# Patient Record
Sex: MALE | Race: WHITE | NOT HISPANIC OR LATINO | Employment: STUDENT | URBAN - METROPOLITAN AREA
[De-identification: names, ages, dates, MRNs, and addresses within clinical notes are randomized per-mention and may not be internally consistent; named-entity substitution may affect disease eponyms.]

---
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
## 2022-04-15 ENCOUNTER — APPOINTMENT (EMERGENCY)
Dept: RADIOLOGY | Facility: HOSPITAL | Age: 22
End: 2022-04-15
Payer: COMMERCIAL

## 2022-04-15 ENCOUNTER — HOSPITAL ENCOUNTER (EMERGENCY)
Facility: HOSPITAL | Age: 22
Discharge: HOME | End: 2022-04-15
Attending: EMERGENCY MEDICINE
Payer: COMMERCIAL

## 2022-04-15 VITALS
OXYGEN SATURATION: 99 % | WEIGHT: 227 LBS | TEMPERATURE: 98.7 F | SYSTOLIC BLOOD PRESSURE: 150 MMHG | HEIGHT: 68 IN | RESPIRATION RATE: 18 BRPM | HEART RATE: 82 BPM | DIASTOLIC BLOOD PRESSURE: 66 MMHG | BODY MASS INDEX: 34.4 KG/M2

## 2022-04-15 DIAGNOSIS — R14.1 ABDOMINAL GAS PAIN: ICD-10-CM

## 2022-04-15 DIAGNOSIS — R10.9 ABDOMINAL PAIN: Primary | ICD-10-CM

## 2022-04-15 DIAGNOSIS — K59.00 CONSTIPATION: ICD-10-CM

## 2022-04-15 PROCEDURE — 99282 EMERGENCY DEPT VISIT SF MDM: CPT | Performed by: EMERGENCY MEDICINE

## 2022-04-15 PROCEDURE — 99284 EMERGENCY DEPT VISIT MOD MDM: CPT

## 2022-04-15 PROCEDURE — 74022 RADEX COMPL AQT ABD SERIES: CPT

## 2022-04-15 RX ORDER — SIMETHICONE 80 MG
80 TABLET,CHEWABLE ORAL ONCE
Status: COMPLETED | OUTPATIENT
Start: 2022-04-15 | End: 2022-04-15

## 2022-04-15 RX ORDER — SIMETHICONE 180 MG
180 CAPSULE ORAL EVERY 8 HOURS PRN
Qty: 15 CAPSULE | Refills: 0 | Status: SHIPPED | OUTPATIENT
Start: 2022-04-15 | End: 2022-05-06

## 2022-04-15 RX ADMIN — SIMETHICONE 80 MG: 80 TABLET, CHEWABLE ORAL at 09:50

## 2022-04-15 NOTE — ED PROVIDER NOTES
History  Chief Complaint   Patient presents with    Abdominal Pain     left side pain- woke him up at 52      25year-old otherwise healthy male presents to the ED for evaluation of acute onset left-sided abdominal pain since waking up this morning  Pain lasted for several minutes  Mom became very concerned as patient has never had pain like this before  Mom brought patient to the emergency department for further evaluation  Patient states that he felt a little bloated and gassy  After he passed some gas his pain resolved  Patient is currently pain-free in the emergency department  Patient denies any nausea, vomiting, diarrhea, dysuria, or any increased urinary frequency  Patient denies any fevers or chills  Patient is vaccinated against COVID-19  Mom states that patient does not eat healthy  Patient has had intermittent constipations  Last bowel movement was yesterday  History provided by:  Parent and patient  Abdominal Pain  Associated symptoms: no chest pain, no cough, no diarrhea, no dysuria, no fatigue, no fever, no nausea, no shortness of breath, no sore throat and no vomiting        Prior to Admission Medications   Prescriptions Last Dose Informant Patient Reported? Taking?    DULoxetine (CYMBALTA) 30 mg delayed release capsule 4/14/2022 at Unknown time  No Yes   Sig: Take 1 capsule (30 mg total) by mouth daily In am      Facility-Administered Medications: None       Past Medical History:   Diagnosis Date    Anxiety     Chronic nasal congestion     Earache, chronic     Migraine     Orthodontics     Premature baby     Wears eyeglasses        Past Surgical History:   Procedure Laterality Date    ACHILLES TENDON LENGTHENING Bilateral     AIRWAY SURGERY  2003    make airway larger with cartilage    BRONCHOSCOPY      Last assessed 02/07/17   Oriana Atkinson CARDIAC SURGERY      MYRINGOTOMY      Last assessed 02/07/17    NASAL SEPTOPLASTY W/ TURBINOPLASTY Bilateral 6/27/2019    Procedure: Izabela Mater SUBMUCOUS RESECTION, REPAIR NASAL VESTIBULAR STENOSIS;  Surgeon: Timothy Peña MD;  Location: [facility];  Service: ENT    NASAL/SINUS ENDOSCOPY N/A 6/27/2019    Procedure: IMAGED GUIDED FESS, ENDOSCOPIC MAXILLARY ANTROSTOMY & TOTAL ETHMOIDECTOMY;  Surgeon: Timothy Peña MD;  Location: [facility];  Service: ENT    PATENT DUCTUS ARTERIOUS LIGATION      Trans catheter closure       Family History   Problem Relation Age of Onset    Diabetes Father     Hypertension Paternal Grandmother     Cancer Family     No Known Problems Mother      I have reviewed and agree with the history as documented  E-Cigarette/Vaping    E-Cigarette Use Never User      E-Cigarette/Vaping Substances    Nicotine No     THC No     CBD No     Flavoring No     Other No     Unknown No      Social History     Tobacco Use    Smoking status: Never Smoker    Smokeless tobacco: Never Used   Vaping Use    Vaping Use: Never used   Substance Use Topics    Alcohol use: No    Drug use: No       Review of Systems   Constitutional: Negative for activity change, fatigue and fever  HENT: Negative for congestion, ear discharge and sore throat  Eyes: Negative for pain and redness  Respiratory: Negative for cough, chest tightness, shortness of breath and wheezing  Cardiovascular: Negative for chest pain  Gastrointestinal: Positive for abdominal pain  Negative for diarrhea, nausea and vomiting  Endocrine: Negative for cold intolerance  Genitourinary: Negative for dysuria and urgency  Musculoskeletal: Negative for arthralgias and back pain  Neurological: Negative for dizziness, weakness and headaches  Psychiatric/Behavioral: Negative for agitation and behavioral problems  Physical Exam  Physical Exam  Vitals and nursing note reviewed  Constitutional:       Appearance: He is well-developed  HENT:      Head: Normocephalic and atraumatic        Nose: Nose normal    Eyes:      Conjunctiva/sclera: Conjunctivae normal    Cardiovascular:      Rate and Rhythm: Normal rate and regular rhythm  Heart sounds: Normal heart sounds  Pulmonary:      Effort: Pulmonary effort is normal       Breath sounds: Normal breath sounds  Abdominal:      General: Bowel sounds are normal  There is no distension  Palpations: Abdomen is soft  Tenderness: There is no abdominal tenderness  Comments: Abdomen is soft, nondistended, with bowel sounds present all 4 quadrants  No tenderness noted to palpation of abdomen  Musculoskeletal:         General: Normal range of motion  Cervical back: Normal range of motion and neck supple  Skin:     General: Skin is warm  Neurological:      General: No focal deficit present  Mental Status: He is alert and oriented to person, place, and time  Psychiatric:         Mood and Affect: Mood normal          Behavior: Behavior normal          Thought Content: Thought content normal          Judgment: Judgment normal          Vital Signs  ED Triage Vitals [04/15/22 0732]   Temperature Pulse Respirations Blood Pressure SpO2   98 7 °F (37 1 °C) 92 18 141/82 98 %      Temp Source Heart Rate Source Patient Position - Orthostatic VS BP Location FiO2 (%)   Oral Monitor Sitting Left arm --      Pain Score       2           Vitals:    04/15/22 0846 04/15/22 0901 04/15/22 0916 04/15/22 0946   BP: 141/76 130/64 132/70 150/66   Pulse: 78 82 82 82   Patient Position - Orthostatic VS:             Visual Acuity      ED Medications  Medications   simethicone (MYLICON) chewable tablet 80 mg (80 mg Oral Given 4/15/22 0950)       Diagnostic Studies  Results Reviewed     None                 XR abdomen obstruction series    (Results Pending)              Procedures  Procedures         ED Course  ED Course as of 04/15/22 0959   Fri Apr 15, 2022   0915 Patient re-examined bedside  Patient has not had any abdominal pain since coming to the emergency department    Discussed radiology results with patient  Patient will be discharged home  NIKI      Most Recent Value   SBIRT (13-21 yo)    In order to provide better care to our patients, we are screening all of our patients for alcohol and drug use  Would it be okay to ask you these screening questions? Yes Filed at: 04/15/2022 0739   NIKI Initial Screen: During the past 12 months, did you:    1  Drink any alcohol (more than a few sips)? No Filed at: 04/15/2022 0739   2  Smoke any marijuana or hashish No Filed at: 04/15/2022 0739   3  Use anything else to get high? ("anything else" includes illegal drugs, over the counter and prescription drugs, and things that you sniff or 'valenzuela')? No Filed at: 04/15/2022 0739                                          MDM  Number of Diagnoses or Management Options  Abdominal gas pain  Abdominal pain  Constipation  Diagnosis management comments: Obtain obstruction series       Amount and/or Complexity of Data Reviewed  Clinical lab tests: ordered and reviewed  Tests in the radiology section of CPT®: ordered and reviewed  Review and summarize past medical records: yes  Independent visualization of images, tracings, or specimens: yes    Risk of Complications, Morbidity, and/or Mortality  General comments: Obstruction series did not show any acute pathology however there was stool noted throughout the colon as well as gas bubbles  At this time patient's symptoms are most consistent with gas pain and a component of constipation  Patient states that he has a very poor diet  I discussed eating high-fiber and vegetables to help with patient's constipation  Patient is discharged home on simethicone as well as over-the-counter stool softener as needed  Patient will follow up with PCP  Close return instructions given to return to the ER for any worsening symptoms  Patient agrees with discharge plan  Patient well appearing at time of discharge      Please Note: Fluency Direct voice recognition software may have been used in the creation of this document  Wrong words or sound a like substitutions may have occurred due to the inherent limitations of the voice software  Patient Progress  Patient progress: resolved      Disposition  Final diagnoses:   Abdominal pain   Constipation   Abdominal gas pain     Time reflects when diagnosis was documented in both MDM as applicable and the Disposition within this note     Time User Action Codes Description Comment    4/15/2022  9:25 AM [place] Catrina Add [R10 9] Abdominal pain     4/15/2022  9:25 AM Kailyn Mcneil Add [K59 00] Constipation     4/15/2022  9:25 AM [place] Catrina Add [R14 1] Abdominal gas pain       ED Disposition     ED Disposition Condition Date/Time Comment    Discharge Stable Fri Apr 15, 2022  9:25 AM Bassam Sorto discharge to home/self care  Follow-up Information     Follow up With Specialties Details Why [address] [facility], [address] In 3 days  [id number]  [phone]            Discharge Medication List as of 4/15/2022  9:31 AM      CONTINUE these medications which have NOT CHANGED    Details   DULoxetine (CYMBALTA) 30 mg delayed release capsule Take 1 capsule (30 mg total) by mouth daily In am, Starting Mon 7/26/2021, Normal             No discharge procedures on file      PDMP Review     None          ED Provider  Electronically Signed by           Annamaria Birmingham DO  04/15/22 6847

## 2022-05-06 ENCOUNTER — OFFICE VISIT (OUTPATIENT)
Dept: FAMILY MEDICINE CLINIC | Facility: CLINIC | Age: 22
End: 2022-05-06
Payer: COMMERCIAL

## 2022-05-06 VITALS
WEIGHT: 229.4 LBS | HEART RATE: 92 BPM | BODY MASS INDEX: 34.77 KG/M2 | SYSTOLIC BLOOD PRESSURE: 126 MMHG | TEMPERATURE: 98.2 F | DIASTOLIC BLOOD PRESSURE: 82 MMHG | HEIGHT: 68 IN | OXYGEN SATURATION: 99 % | RESPIRATION RATE: 18 BRPM

## 2022-05-06 DIAGNOSIS — R39.9 UTI SYMPTOMS: ICD-10-CM

## 2022-05-06 DIAGNOSIS — N34.2 URETHRITIS: Primary | ICD-10-CM

## 2022-05-06 DIAGNOSIS — F41.1 GAD (GENERALIZED ANXIETY DISORDER): ICD-10-CM

## 2022-05-06 DIAGNOSIS — E66.9 OBESITY (BMI 30-39.9): ICD-10-CM

## 2022-05-06 PROBLEM — H61.22 IMPACTED CERUMEN OF LEFT EAR: Status: RESOLVED | Noted: 2017-02-21 | Resolved: 2022-05-06

## 2022-05-06 LAB
SL AMB  POCT GLUCOSE, UA: NEGATIVE
SL AMB LEUKOCYTE ESTERASE,UA: NEGATIVE
SL AMB POCT BILIRUBIN,UA: NEGATIVE
SL AMB POCT BLOOD,UA: NEGATIVE
SL AMB POCT CLARITY,UA: CLEAR
SL AMB POCT COLOR,UA: NORMAL
SL AMB POCT KETONES,UA: NEGATIVE
SL AMB POCT NITRITE,UA: NEGATIVE
SL AMB POCT PH,UA: 6
SL AMB POCT SPECIFIC GRAVITY,UA: 1.02
SL AMB POCT URINE PROTEIN: NEGATIVE
SL AMB POCT UROBILINOGEN: NORMAL

## 2022-05-06 PROCEDURE — 81003 URINALYSIS AUTO W/O SCOPE: CPT | Performed by: FAMILY MEDICINE

## 2022-05-06 PROCEDURE — 99214 OFFICE O/P EST MOD 30 MIN: CPT | Performed by: FAMILY MEDICINE

## 2022-05-06 RX ORDER — DULOXETIN HYDROCHLORIDE 30 MG/1
30 CAPSULE, DELAYED RELEASE ORAL DAILY
Qty: 90 CAPSULE | Refills: 1 | Status: SHIPPED | OUTPATIENT
Start: 2022-05-06

## 2022-05-06 NOTE — PROGRESS NOTES
Assessment/Plan:    No problem-specific Assessment & Plan notes found for this encounter  Nonspecific urethritis  STDs not suspected  Instructed to drink more water  Monitor sx  ua normal  If no better, can call for urology referral    JOSELYN stable on cymbalta    bmi aware, asked him to eat healthier and exercise more       Diagnoses and all orders for this visit:    Urethritis    UTI symptoms  -     POCT urine dip auto non-scope    JOSELYN (generalized anxiety disorder)  -     DULoxetine (CYMBALTA) 30 mg delayed release capsule; Take 1 capsule (30 mg total) by mouth daily In am    BMI 34 0-34 9,adult    Obesity (BMI 30-39  9)              Return if symptoms worsen or fail to improve  Subjective:      Patient ID: Nicholas Díaz is a 25 y o  male  Chief Complaint   Patient presents with    Urinary Tract Infection     nm  lpn    Painful Urination    Urinary Frequency       HPI  ua neg in office    Dull pain in penis  Sometimes tip and/or shaft  1w  On/off  Daily  Not every void  No blood or dc  No fever  No herbals  No high risk activities  No fb exposure    Anxiety ok  Working soon  Looking forward to it    bmi aware    The following portions of the patient's history were reviewed and updated as appropriate: allergies, current medications, past family history, past medical history, past social history, past surgical history and problem list     Review of Systems   Constitutional: Negative for fever  Genitourinary: Positive for dysuria  Negative for genital sores and hematuria  Current Outpatient Medications   Medication Sig Dispense Refill    DULoxetine (CYMBALTA) 30 mg delayed release capsule Take 1 capsule (30 mg total) by mouth daily In am 90 capsule 1     No current facility-administered medications for this visit         Objective:    /82   Pulse 92   Temp 98 2 °F (36 8 °C)   Resp 18   Ht 5' 8" (1 727 m)   Wt 104 kg (229 lb 6 4 oz)   SpO2 99%   BMI 34 88 kg/m²        Physical Exam  Vitals and nursing note reviewed  Constitutional:       General: He is not in acute distress  Appearance: He is well-developed  He is obese  He is not ill-appearing  HENT:      Head: Normocephalic  Right Ear: Tympanic membrane normal       Left Ear: There is impacted cerumen  Ears:      Comments: Right tube in place     Mouth/Throat:      Pharynx: No oropharyngeal exudate  Eyes:      General: No scleral icterus  Conjunctiva/sclera: Conjunctivae normal    Cardiovascular:      Rate and Rhythm: Normal rate and regular rhythm  Heart sounds: No murmur heard  Pulmonary:      Effort: Pulmonary effort is normal  No respiratory distress  Breath sounds: No wheezing  Abdominal:      Palpations: Abdomen is soft  Tenderness: There is no abdominal tenderness  Genitourinary:     Penis: Normal        Testes: Normal       Prostate: Normal       Rectum: Normal       Comments: No dc  No lesions  No inguinal LA  Musculoskeletal:         General: No deformity  Cervical back: Neck supple  Skin:     General: Skin is warm and dry  Coloration: Skin is not pale  Neurological:      Mental Status: He is alert  Motor: No weakness  Gait: Gait normal    Psychiatric:         Behavior: Behavior normal          Thought Content:  Thought content normal                 Rk Sutton DO

## 2022-10-12 PROBLEM — Z13.83 SCREENING FOR CARDIOVASCULAR, RESPIRATORY, AND GENITOURINARY DISEASES: Status: RESOLVED | Noted: 2021-06-21 | Resolved: 2022-10-12

## 2022-10-12 PROBLEM — Z13.6 SCREENING FOR CARDIOVASCULAR, RESPIRATORY, AND GENITOURINARY DISEASES: Status: RESOLVED | Noted: 2021-06-21 | Resolved: 2022-10-12

## 2022-10-12 PROBLEM — Z13.89 SCREENING FOR CARDIOVASCULAR, RESPIRATORY, AND GENITOURINARY DISEASES: Status: RESOLVED | Noted: 2021-06-21 | Resolved: 2022-10-12

## 2022-10-12 PROBLEM — Z13.1 SCREENING FOR DIABETES MELLITUS (DM): Status: RESOLVED | Noted: 2021-06-21 | Resolved: 2022-10-12

## 2023-03-14 ENCOUNTER — OFFICE VISIT (OUTPATIENT)
Dept: FAMILY MEDICINE CLINIC | Facility: CLINIC | Age: 23
End: 2023-03-14

## 2023-03-14 VITALS
TEMPERATURE: 97.9 F | HEIGHT: 68 IN | OXYGEN SATURATION: 97 % | HEART RATE: 97 BPM | WEIGHT: 232 LBS | SYSTOLIC BLOOD PRESSURE: 110 MMHG | DIASTOLIC BLOOD PRESSURE: 78 MMHG | RESPIRATION RATE: 16 BRPM | BODY MASS INDEX: 35.16 KG/M2

## 2023-03-14 DIAGNOSIS — Z13.6 SCREENING FOR CARDIOVASCULAR, RESPIRATORY, AND GENITOURINARY DISEASES: ICD-10-CM

## 2023-03-14 DIAGNOSIS — Z13.89 SCREENING FOR CARDIOVASCULAR, RESPIRATORY, AND GENITOURINARY DISEASES: ICD-10-CM

## 2023-03-14 DIAGNOSIS — Z13.1 SCREENING FOR DIABETES MELLITUS (DM): ICD-10-CM

## 2023-03-14 DIAGNOSIS — H66.011 NON-RECURRENT ACUTE SUPPURATIVE OTITIS MEDIA OF RIGHT EAR WITH SPONTANEOUS RUPTURE OF TYMPANIC MEMBRANE: Primary | ICD-10-CM

## 2023-03-14 DIAGNOSIS — Z96.22 MYRINGOTOMY TUBE(S) STATUS: ICD-10-CM

## 2023-03-14 DIAGNOSIS — E66.9 OBESITY (BMI 30-39.9): ICD-10-CM

## 2023-03-14 DIAGNOSIS — F41.1 GAD (GENERALIZED ANXIETY DISORDER): ICD-10-CM

## 2023-03-14 DIAGNOSIS — Z13.83 SCREENING FOR CARDIOVASCULAR, RESPIRATORY, AND GENITOURINARY DISEASES: ICD-10-CM

## 2023-03-14 PROBLEM — R39.9 UTI SYMPTOMS: Status: RESOLVED | Noted: 2022-05-06 | Resolved: 2023-03-14

## 2023-03-14 PROBLEM — N34.2 URETHRITIS: Status: RESOLVED | Noted: 2022-05-06 | Resolved: 2023-03-14

## 2023-03-14 RX ORDER — DULOXETIN HYDROCHLORIDE 60 MG/1
60 CAPSULE, DELAYED RELEASE ORAL DAILY
Qty: 30 CAPSULE | Refills: 5 | Status: SHIPPED | OUTPATIENT
Start: 2023-03-14

## 2023-03-14 RX ORDER — AMOXICILLIN 875 MG/1
875 TABLET, COATED ORAL 2 TIMES DAILY
Qty: 20 TABLET | Refills: 0 | Status: SHIPPED | OUTPATIENT
Start: 2023-03-14 | End: 2023-03-24

## 2023-03-14 NOTE — PATIENT INSTRUCTIONS
You could retry the duloxetine (cymbalta) at 60mg a day to see if it helps you more than the 30mg did  We can check you in 4 weeks to see if it helps or you need something else

## 2023-03-14 NOTE — PROGRESS NOTES
Assessment/Plan:    No problem-specific Assessment & Plan notes found for this encounter  Right OM with TM tubes  No overt dc noted or erythema but OM suspected based on description  Start amoxil  F/u if no better in case needs floxin drops    JOSELYN  zoloft not effective  Low dose duloxetine did not help per pt  Will restart duloxetine at 60mg/d and f/u 1m    cpe 1m after labs ordered    bmi aware, try to work on diet/exercise    You could retry the duloxetine (cymbalta) at 60mg a day to see if it helps you more than the 30mg did  We can check you in 4 weeks to see if it helps or you need something else  Diagnoses and all orders for this visit:    Non-recurrent acute suppurative otitis media of right ear with spontaneous rupture of tympanic membrane  -     amoxicillin (AMOXIL) 875 mg tablet; Take 1 tablet (875 mg total) by mouth 2 (two) times a day for 10 days    JOSELYN (generalized anxiety disorder)  -     DULoxetine (CYMBALTA) 60 mg delayed release capsule; Take 1 capsule (60 mg total) by mouth daily In am    Screening for cardiovascular, respiratory, and genitourinary diseases  -     Lipid Panel with Direct LDL reflex; Future    Screening for diabetes mellitus (DM)  -     Comprehensive metabolic panel; Future    Obesity (BMI 30-39  9)    Myringotomy tube(s) status              Return in about 1 month (around 4/14/2023) for Annual physical     Subjective:      Patient ID: Chepe Carvajal is a 23 y o  male      Chief Complaint   Patient presents with   • Otitis Media     Kristina Gallo MA        HPI  Right ear clogged  Used paper towel  Reddish material  Dripping out  B/l permanent TM tubes  No fever recently  Left ear some clogged  Hx of seasonal allergies, elsi spring    cymbalta 30mg/d for several months and not effective  But tolerated  zoloft not effective  Still having anxiety/depression  Job not good per pt  Looking for new job     The following portions of the patient's history were reviewed and updated as appropriate: allergies, current medications, past family history, past medical history, past social history, past surgical history and problem list     Review of Systems   Constitutional: Negative for chills and fever  HENT: Positive for ear pain  Current Outpatient Medications   Medication Sig Dispense Refill   • amoxicillin (AMOXIL) 875 mg tablet Take 1 tablet (875 mg total) by mouth 2 (two) times a day for 10 days 20 tablet 0   • DULoxetine (CYMBALTA) 60 mg delayed release capsule Take 1 capsule (60 mg total) by mouth daily In am 30 capsule 5     No current facility-administered medications for this visit  Objective:    /78   Pulse 97   Temp 97 9 °F (36 6 °C)   Resp 16   Ht 5' 8" (1 727 m)   Wt 105 kg (232 lb)   SpO2 97%   BMI 35 28 kg/m²        Physical Exam  Vitals and nursing note reviewed  Constitutional:       Appearance: He is well-developed  He is obese  He is not ill-appearing  HENT:      Head: Normocephalic  Right Ear: Tympanic membrane, ear canal and external ear normal       Left Ear: Tympanic membrane, ear canal and external ear normal       Ears:      Comments: Right cerumen increased w/o impaction     Mouth/Throat:      Pharynx: No oropharyngeal exudate  Eyes:      General: No scleral icterus  Conjunctiva/sclera: Conjunctivae normal    Cardiovascular:      Rate and Rhythm: Normal rate and regular rhythm  Heart sounds: No murmur heard  Pulmonary:      Effort: Pulmonary effort is normal  No respiratory distress  Breath sounds: No wheezing  Abdominal:      Palpations: Abdomen is soft  Musculoskeletal:         General: No deformity  Cervical back: Neck supple  Skin:     General: Skin is warm and dry  Coloration: Skin is not pale  Neurological:      Mental Status: He is alert  Motor: No weakness  Gait: Gait normal    Psychiatric:         Mood and Affect: Mood is anxious           Behavior: Behavior normal  Thought Content:  Thought content normal                 [facility], DO

## 2023-04-16 PROBLEM — H66.011 NON-RECURRENT ACUTE SUPPURATIVE OTITIS MEDIA OF RIGHT EAR WITH SPONTANEOUS RUPTURE OF TYMPANIC MEMBRANE: Status: RESOLVED | Noted: 2023-03-14 | Resolved: 2023-04-16

## 2023-05-13 PROBLEM — Z13.83 SCREENING FOR CARDIOVASCULAR, RESPIRATORY, AND GENITOURINARY DISEASES: Status: RESOLVED | Noted: 2023-03-14 | Resolved: 2023-05-13

## 2023-05-13 PROBLEM — Z13.6 SCREENING FOR CARDIOVASCULAR, RESPIRATORY, AND GENITOURINARY DISEASES: Status: RESOLVED | Noted: 2023-03-14 | Resolved: 2023-05-13

## 2023-05-13 PROBLEM — Z13.1 SCREENING FOR DIABETES MELLITUS (DM): Status: RESOLVED | Noted: 2023-03-14 | Resolved: 2023-05-13

## 2023-05-13 PROBLEM — Z13.89 SCREENING FOR CARDIOVASCULAR, RESPIRATORY, AND GENITOURINARY DISEASES: Status: RESOLVED | Noted: 2023-03-14 | Resolved: 2023-05-13

## 2023-08-21 ENCOUNTER — OFFICE VISIT (OUTPATIENT)
Dept: FAMILY MEDICINE CLINIC | Facility: CLINIC | Age: 23
End: 2023-08-21
Payer: COMMERCIAL

## 2023-08-21 VITALS
TEMPERATURE: 98.9 F | HEART RATE: 72 BPM | SYSTOLIC BLOOD PRESSURE: 114 MMHG | WEIGHT: 236 LBS | BODY MASS INDEX: 35.88 KG/M2 | RESPIRATION RATE: 18 BRPM | DIASTOLIC BLOOD PRESSURE: 72 MMHG

## 2023-08-21 DIAGNOSIS — H66.001 ACUTE SUPPURATIVE OTITIS MEDIA OF RIGHT EAR WITHOUT SPONTANEOUS RUPTURE OF TYMPANIC MEMBRANE, RECURRENCE NOT SPECIFIED: Primary | ICD-10-CM

## 2023-08-21 PROCEDURE — 99213 OFFICE O/P EST LOW 20 MIN: CPT | Performed by: NURSE PRACTITIONER

## 2023-08-21 RX ORDER — AMOXICILLIN 875 MG/1
875 TABLET, COATED ORAL 2 TIMES DAILY
Qty: 20 TABLET | Refills: 0 | Status: SHIPPED | OUTPATIENT
Start: 2023-08-21 | End: 2023-08-31

## 2023-08-21 NOTE — PATIENT INSTRUCTIONS
Amoxicillin (By mouth)   Amoxicillin (d-wru-n-KRISTEN-in)  Treats infections or stomach ulcers. This medicine is a penicillin antibiotic. Brand Name(s): Moxatajailyn Prevpac   There may be other brand names for this medicine. When This Medicine Should Not Be Used: This medicine is not right for everyone. You should not use it if you had an allergic reaction to amoxicillin, any type of penicillin, or a cephalosporin antibiotic. How to Use This Medicine:   Capsule, Liquid, Tablet, Chewable Tablet, Long Acting Tablet  Your doctor will tell you how much medicine to use. Do not use more than directed. Chewable tablet: You must chew the tablet before you swallow it. You may crush the tablet and mix the medicine with a small amount of food to make it easier to swallow. Oral liquid: Shake well just before each use. Measure the oral liquid medicine with a marked measuring spoon, oral syringe, or medicine cup. You may mix the oral liquid with a baby formula, milk, fruit juice, water, ginger ale, or another cold drink. Be sure your child drinks all of the mixture right away. Tablet for suspension: Place the tablet in a small drinking glass, and add 2 teaspoons of water. Do not use any other liquid. Gently stir or swirl the water in the glass until the tablet is completely dissolved. Drink all of this mixture right away. Add more water to the glass and drink all of it to make sure you get all of the medicine. Do not chew or swallow the tablet for suspension. Take all of the medicine in your prescription to clear up your infection, even if you feel better after the first few doses. Take a dose as soon as you remember. If it is almost time for your next dose, wait until then and take a regular dose. Do not take extra medicine to make up for a missed dose. Store the tablets, capsules, and tablets for suspension at room temperature, away from heat, moisture, and direct light. Store the oral liquid in the refrigerator.  Do not freeze. Throw away any unused medicine after 14 days. Drugs and Foods to Avoid:   Ask your doctor or pharmacist before using any other medicine, including over-the-counter medicines, vitamins, and herbal products. Some medicines can affect how amoxicillin works. Tell your doctor if you are also using any of the following:   Allopurinol  Probenecid  Birth control pills  A blood thinner  Warnings While Using This Medicine:   Tell your doctor if you are pregnant or breastfeeding, or if you have kidney disease, allergies, or a condition called phenylketonuria (PKU). Tell your doctor if you are on dialysis. This medicine can cause diarrhea. Call your doctor if the diarrhea becomes severe, does not stop, or is bloody. Do not take any medicine to stop diarrhea until you have talked to your doctor. Diarrhea can occur 2 months or more after you stop taking this medicine. Tell any doctor or dentist who treats you that you are using this medicine. This medicine may affect certain medical test results. Call your doctor if your symptoms do not improve or if they get worse. Use this medicine to treat only the infection your doctor has prescribed it for. Do not use this medicine for any infection or condition that has not been checked by a doctor. This medicine will not treat the flu or the common cold. Keep all medicine out of the reach of children. Never share your medicine with anyone. Possible Side Effects While Using This Medicine:   Call your doctor right away if you notice any of these side effects:   Allergic reaction: Itching or hives, swelling in your face or hands, swelling or tingling in your mouth or throat, chest tightness, trouble breathing  Blistering, peeling, or red skin rash  Diarrhea that may contain blood, stomach cramps, fever  If you notice these less serious side effects, talk with your doctor:   Mild diarrhea, nausea, or vomiting  Mild skin rash  If you notice other side effects that you think are caused by this medicine, tell your doctor. Call your doctor for medical advice about side effects. You may report side effects to FDA at 1-876-EOE-4019  © Copyright [employer] 2022 Information is for End User's use only and may not be sold, redistributed or otherwise used for commercial purposes. The above information is an  only. It is not intended as medical advice for individual conditions or treatments. Talk to your doctor, nurse or pharmacist before following any medical regimen to see if it is safe and effective for you.

## 2023-08-21 NOTE — PROGRESS NOTES
Assessment/Plan:    1. Acute suppurative otitis media of right ear without spontaneous rupture of tympanic membrane, recurrence not specified  -     neomycin-polymyxin-hydrocortisone (CORTISPORIN) otic solution; Administer 3 drops to the right ear every 8 (eight) hours for 7 days  -     amoxicillin (AMOXIL) 875 mg tablet; Take 1 tablet (875 mg total) by mouth 2 (two) times a day for 10 days          Patient Instructions:  Supportive care discussed and advised. Advised to RTO for any worsening and no improvement. Follow up for no improvement and worsening of conditions. Patient advised and educated when to see immediate medical care. Take medication with food. It is important that you take the entire course of antibiotics prescribed. May also take a probiotic of your choice to maintain healthy GI natanael. Can take some probiotic and yogurt with the medication. Return if symptoms worsen or fail to improve. No future appointments. Subjective:      Patient ID: Lindsey Nguyen is a 23 y.o. male. Chief Complaint   Patient presents with   • Earache     SX few weeks ago, right ear                 sas/cma         Vitals:  /72   Pulse 72   Temp 98.9 °F (37.2 °C)   Resp 18   Wt 107 kg (236 lb)   BMI 35.88 kg/m²     HPI  Patient stated that having right ear pain from couple of weeks. Stated that has permanent bilateral ear tubes. Denies fever, chills and injury. The following portions of the patient's history were reviewed and updated as appropriate: allergies, current medications, past family history, past medical history, past social history, past surgical history and problem list.      Review of Systems   Constitutional: Negative for chills, diaphoresis, fatigue, fever and unexpected weight change. HENT: Positive for ear pain.  Negative for congestion, dental problem, drooling, ear discharge, facial swelling, hearing loss, mouth sores, nosebleeds, postnasal drip, rhinorrhea, sinus pressure, sinus pain, sneezing, sore throat, tinnitus, trouble swallowing and voice change. Respiratory: Negative for cough, chest tightness, shortness of breath and wheezing. Cardiovascular: Negative. Gastrointestinal: Negative for abdominal pain, constipation, diarrhea, nausea and vomiting. Musculoskeletal: Negative. Skin: Negative. Neurological: Negative for dizziness, light-headedness and headaches. Objective:    Social History     Tobacco Use   Smoking Status Never   Smokeless Tobacco Never       Allergies: No Known Allergies      Current Outpatient Medications   Medication Sig Dispense Refill   • amoxicillin (AMOXIL) 875 mg tablet Take 1 tablet (875 mg total) by mouth 2 (two) times a day for 10 days 20 tablet 0   • DULoxetine (CYMBALTA) 60 mg delayed release capsule Take 1 capsule (60 mg total) by mouth daily In am 90 capsule 1   • neomycin-polymyxin-hydrocortisone (CORTISPORIN) otic solution Administer 3 drops to the right ear every 8 (eight) hours for 7 days 10 mL 0   • chlorhexidine (PERIDEX) 0.12 % solution  (Patient not taking: Reported on 8/21/2023)       No current facility-administered medications for this visit. Physical Exam  Vitals reviewed. Constitutional:       Appearance: Normal appearance. He is well-developed. HENT:      Head: Normocephalic. Right Ear: External ear normal. Drainage (purulent and not able to visualize TM due to that and myringotomy tube noted in right ear) and tenderness present. Left Ear: Tympanic membrane, ear canal and external ear normal.      Nose: Nose normal.      Right Sinus: No maxillary sinus tenderness or frontal sinus tenderness. Left Sinus: No maxillary sinus tenderness or frontal sinus tenderness. Mouth/Throat:      Mouth: No oral lesions. Pharynx: No oropharyngeal exudate or posterior oropharyngeal erythema. Cardiovascular:      Rate and Rhythm: Normal rate and regular rhythm.       Heart sounds: Normal heart sounds. Pulmonary:      Effort: Pulmonary effort is normal.      Breath sounds: Normal breath sounds. Musculoskeletal:         General: Normal range of motion. Cervical back: Neck supple. Lymphadenopathy:      Cervical:      Right cervical: No superficial or posterior cervical adenopathy. Left cervical: No superficial or posterior cervical adenopathy. Skin:     General: Skin is warm and dry. Neurological:      Mental Status: He is alert and oriented to person, place, and time. Psychiatric:         Behavior: Behavior normal.         Thought Content:  Thought content normal.         Judgment: Judgment normal.                     BABITA Henning

## 2023-08-29 ENCOUNTER — TELEPHONE (OUTPATIENT)
Dept: PSYCHIATRY | Facility: CLINIC | Age: 23
End: 2023-08-29

## 2023-08-29 NOTE — TELEPHONE ENCOUNTER
Behavorial Health Outpatient Intake Questions    Referred by:    Please advised interviewee that they need to answer all questions truthfully to allow for best care and any misrepresentations of information may affect their ability to be seen at this clinic   => Was this discussed? Yes     [facility]     Presenting Problem (in patient's words): Anxiety & Depression    Are there any developmental disabilities? ? If yes, can they speak to you on the phone? If they are too limited to speak to you on phone, refer out No    Are you taking any psychiatric medications? Yes   Duloxetine by Dr Mari Huang   => If yes, who prescribes? If yes, are they injectable medications? Does the patient have a language barrier or hearing impairment? No    Have you been [facility] at [facility] by a therapist or a doctor in the past? If yes, who? No    Has the patient been hospitalized for mental health? No   If yes, how long ago was last hospitalization and where was it? Do you actively use alcohol or marijuana or illegal substances? If yes, what and how much - refer out to Drug and alcohol treatment if use is excessive or daily use of illegal substances No concerns of substance abuse are reported. Do you have a community treatment team or ? No    Legal History-     Does the patient have any history of arrests, [facility]/care home time, or DUIs? No  If Yes-  1) What types of charges? 2) When were they last incarcerated? 3) Are they currently on parole or probation? Minor Child-    Who has custody of the child? Is there a custody agreement? If there is a custody agreement remind parent that they must bring a copy to the first appt or they will not be seen.      Intake Team, please check with provider before scheduling if flags come up such as:  - complex case  - legal history (other than DUI)  - communication barrier concerns are present  - if, in your judgment, this needs further review    ACCEPTED as a patient Yes  => Appointment Date: 9/14/2023    Referred Elsewhere? No    Name of Insurance Co:Bc/RAJIV [id number] [facility] system already  Insurance ID#  Insurance Phone #  If ins is primary or secondary  If patient is a minor, parents information such as Name, D. O.B of guarantor.

## 2023-09-28 ENCOUNTER — SOCIAL WORK (OUTPATIENT)
Dept: BEHAVIORAL/MENTAL HEALTH CLINIC | Facility: CLINIC | Age: 23
End: 2023-09-28
Payer: COMMERCIAL

## 2023-09-28 DIAGNOSIS — F33.1 MODERATE RECURRENT MAJOR DEPRESSION (HCC): ICD-10-CM

## 2023-09-28 DIAGNOSIS — F41.1 GAD (GENERALIZED ANXIETY DISORDER): Primary | ICD-10-CM

## 2023-09-28 PROCEDURE — 90791 PSYCH DIAGNOSTIC EVALUATION: CPT | Performed by: SOCIAL WORKER

## 2023-09-28 NOTE — PSYCH
Behavioral Health Psychotherapy Assessment    Date of Initial Psychotherapy Assessment: 09/28/23  Referral Source: PCP  Has a release of information been signed for the referral source? NO    Preferred Name: Mg De La O  Preferred Pronouns: He/Him  YOB: 2000 Age: 23 y.o. Sex assigned at birth: Male   Gender Identity: Male  Race:   Preferred Language: English    Emergency Contact:  Full Name: Armin Virk  Relationship to Client: mother  Contact information: [phone]    Primary Care Physician:  Farzad Kumar DO  [address]  [phone]  Has a release of information been signed? 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 No    Relevant Family History:  Lives with mother, step father and half brother who is there part time. His father was out of the picture before he was born, has never met him, only knows he has a new wife and children. He relates he never wants to have kids and doesn't want to continue his father's blood line. He saw a therapist in childhood due to his father not being in his life, relates he doesn't think about it much now but when it does it brings his mood down. Mother, history of depression, significant when patient was 17years of age.     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 'I have low self esteem, feel like I have general anxiety, was more social anxiety when younger, get incredibly stressed in public, when people talk politics, when left my job in July didn't come out of the house hardly until recently'. Reports being bullied throughout his life, 'was nerdy a target.'    Client started isolating at home after he left his job at [employer]. His hours were cut back from 40-50 per week to about 15 hours as they hired more people. After he left he was involved in a romantic relationship with his former boss (30) who 'put a lot off issues on me, depended on me to make decisions for her, was often suicidal and turned to him.'  He relates it was incredibly stressful and he broke it off with her. He also got involved in a romantic relationship with a male friend but found out he was interested in someone else. Client has history of depression and social anxiety. Has had 2 suicide attempts around age [age] (tried to strangle himself with a cord) and engaged in self mutilation (as a teen, none in years per report). He has history of being bullied throughout school years. Claudette Alpers is currently taking DULoxetine (Cymbalta) for is anxiety. Patient considers his strengths to be: 'I have a lot of passions, interests, really want to travel and go back to college.'  Interests:  cultures, comics, music, writing, playing card games such as Magic, collects books, video games, loves movies, horror and [address] horror films  Goals: go back to college in spring and take more classes, wants to pursue writing, also get his 's license. Mental Health Advance Directive:  Do you currently have a Mental Health AdvanceDirective: No     Diagnosis:  No diagnosis found.     Initial Assessment:     Current Mental Status:    Appearance: appropriate, casual and disheveled      Behavior/Manner: cooperative Affect/Mood:  Depressed and anxious    Speech:  Normal    Sleep: Insomnia and hypersomnia    Oriented to: oriented to self, oriented to place and oriented to time       Clinical Symptoms    Depression: yes      Anxiety: yes      Depression Symptoms: depressed mood, serious loss of interest in things, thoughts that death would be easier, insomnia and irritable      Anxiety Symptoms: excessive worry and irritable      Have you ever been assaultive to others or the environment: No      Have you ever been self-injurious: Yes    Additional Abuse/Self Harm history:  Used to cut on himself as a teen 'not in years.'    Counseling History:  Previous Counseling or Treatment  ([address] [place] [address] or Drug & Alcohol): Yes    Previous Counseling Details:  Remembers seeing a male counselor as a child due to his father not being in his life. Have you previously taken psychiatric medications: Yes    Previous Medications Attempted:  Cymbalta    Suicide Risk Assessment  Have you ever had a suicide attempt: Yes    Have you had incidents of suicidal ideation: Yes    Are you currently experiencing suicidal thoughts: Yes    Additional Suicide Risk Information:  At age [age] had two attempts at suicide both times involved him trying to strangle himself with cord. His mother was very depressed when he was around age 17, he was being bullied     Substance Abuse/Addiction Assessment:  Alcohol: No    Marijuana:  Yes    Age of First Use:  27  Age of regular use:  Occassional use, every couple months  Frequency:  Monthly  Amount:  Helps with my anxiety, stops me from over thinking  Have you experienced blackouts as a result of substance use: No    Have you had any periods of abstinence: No    Have you experienced symptoms of withdrawal: No    Have you ever overdosed on any substances?: No    Are you currently using any Medication Assisted Treatment for Substance Use: No      Compulsive Behaviors:  Compulsive Behavior Information:  None    Disordered Eating History:  Do you have a history of disordered eating: No      Social Determinants of Health:    SDOH:  Unemployment/underemployment    Legal History:    Have you ever been arrested  or had a DUI: No      Have you been incarcerated: No      Are you currently on parole/probation: No      Any current Children and Youth involvement: No      Any pending legal charges: No      Relationship History:    Current marital status: single      Natural Supports:  Friends    Relationship History:  Supports - Alverto, my best friend for many years, like a brother    Good relationship with mom but has hard time talking with her about mental health    Mom has history of depression, when in 6th grade her depression was really bad, she didn't care if he went to school or not and he would miss more school than usual        Employment History    Are you currently employed: Yes      Longest period of employment:  [employer], 4 years     Employer/ Job title:  Recently started doing janAdifyial work at [employer] work goals:  Go to college, Would like to be a     Sources of income/financial support:  Work     History:      Status: no history of [address] duty  Educational History:     Have you ever been diagnosed with a learning disability: No      Highest level of education:  Some college    School attended/attending:  One teacher thought he may have dyslexia, was a [employer] and Senior during [facility], negatively impacted him    Have you ever had an IEP or 504-plan: No      Do you need assistance with reading or writing: No      Recommended Treatment:     Psychotherapy:  Individual sessions    Frequency:  2 times    Session frequency:  Monthly      Visit start and stop times:    09/28/23

## 2023-10-12 ENCOUNTER — TELEPHONE (OUTPATIENT)
Dept: PSYCHIATRY | Facility: CLINIC | Age: 23
End: 2023-10-12

## 2023-10-12 ENCOUNTER — TELEMEDICINE (OUTPATIENT)
Dept: BEHAVIORAL/MENTAL HEALTH CLINIC | Facility: CLINIC | Age: 23
End: 2023-10-12
Payer: COMMERCIAL

## 2023-10-12 DIAGNOSIS — F41.1 GAD (GENERALIZED ANXIETY DISORDER): Primary | ICD-10-CM

## 2023-10-12 PROCEDURE — 90834 PSYTX W PT 45 MINUTES: CPT | Performed by: SOCIAL WORKER

## 2023-10-12 NOTE — PSYCH
Behavioral Health Psychotherapy Progress Note    Virtual Regular Visit    Verification of patient location:    Patient is located at Home in the following state in which I hold an active license [place]. Patient states they are at home address with privacy. Writer is calling from private office at [facility], [address] [place] [address]. Patient identifies by name,  and address. Psychotherapy Provided: Individual Psychotherapy     No diagnosis found. 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 He relates he didn't get much training before previous worker left and he worries he is not doing enough. He asked his boss who told him he was doing fine but wanted to wait awhile before sitting down for a more formal review of what is going well, what he needs to work on. He worries excessively about not being good enough, worried in previous job as well and had social anxiety growing up, fears negative judgment and anticipates it. He has been focused on work and then stays up at night 'I'm a night owl' usually maria elena. He would like to save money to get a place with his best friend Renetta Ramos. JOSELYN-7 score 12 this day indicating moderate anxiety. Noa quijano he has been avoiding going out and isolating a lot because doesn't want to see his former girlfriend who had significant emotional instability, mental health issues, often turned to him when suicidal. She had inpatient hospitalizations and he was overwhelmed at 22 trying to be responsible for her mental health needs. Normalized, validated, discussed working on ways to reduce overwhelm, process what happened and not live fear based and in isolation. Encouraged him to stay in therapy, provides rationale.     During this session, this clinician used the following therapeutic modalities: Engagement Strategies, Client-centered Therapy, Cognitive Behavioral Therapy, Solution-Focused Therapy, and Supportive Psychotherapy. Developed treatment plan collaboratively with Danna Rios. Discussed provider leaving agency and end of month and Danna Rios amenable to transfer to another therapist.    Substance Abuse was not addressed during this session. If the client is diagnosed with a co-occurring substance use disorder, please indicate any changes in the frequency or amount of use: NA. Stage of change for addressing substance use diagnoses: NA    ASSESSMENT:  Hunter Gann presents with a Euthymic/ normal and Anxious mood. his affect is Normal range and intensity, which is congruent, with his mood and the content of the session. The client has not made progress on their goals. Initial stage of therapy. Linear thinking, oriented x4, denies any SI, self harm, HI, denies psychotic symptoms, linear, logical thinking, memory in tact, judgment fair, insight fair. Hunter Gann presents with a minimal risk of suicide, minimal risk of self-harm, and minimal risk of harm to others. For any risk assessment that surpasses a "low" rating, a safety plan must be developed. A safety plan was indicated: no  If yes, describe in detail NA    PLAN: Between sessions, Hunter Gann will start to work on his financial goals of saving money to move in with friend Alverto, decrease self isolation. At the next session, the therapist will use Client-centered Therapy and Cognitive Behavioral Therapy to address fear-based thinking. Behavioral Health Treatment Plan and Discharge Planning: Hunter Gann is aware of and agrees to continue to work on their treatment plan.  They have identified and are working toward their discharge goals YES     Visit start and stop times:    10/12/23

## 2023-10-12 NOTE — BH TREATMENT PLAN
Outpatient Behavioral Health Psychotherapy Treatment Plan    Belinda Hathaway  2000     Date of Initial Psychotherapy Assessment: 8/29/2023  Date of Current Treatment Plan: 10/12/23  Treatment Plan Target Date: 8/29/2024  Treatment Plan Expiration Date: 8/29/2024    Diagnosis:   No diagnosis found. Area(s) of Need: anxiety with job, panic attacks every couple months, feeling not good enough at job, fear of being negatively judged, particularly in work life, history of social anxiety, worries excessively, isolates    Long Term Goal 1 (in the client's own words): Reduce anxiety and not let my anxiety eat at me so much. Stage of Change: Pre Contemplation    Target Date for completion: 8/29/2024     Anticipated therapeutic modalities: CBT, ACT, Client Centered, Solution Focused, Trauma-Informed     People identified to complete this goal: Claudette Alpers with support of therapist      Objective 1: (identify the means of measuring success in meeting the objective: Learn and practice safe coping skills for preventing, reducing anxiety. Objective 2: (identify the means of measuring success in meeting the objective): Learn and practice strategies for replacing fear-based thoughts with more helpful thoughts. Success will me measured by JOSELYN-7 scores reducing from moderate range to mild range and sustaining mild levels for 60 days. I am currently under the care of a [facility] psychiatric provider: no    My [facility] psychiatric provider is: NA    I am currently taking psychiatric medications: NO    I feel that I will be ready for discharge from mental health care when I reach the following (measurable goal/objective): 'When I don't let my anxiety eat at me as much.'    For children and adults who have a legal guardian:   Has there been any change to custody orders and/or guardianship status? NA. If yes, attach updated documentation.     I have not yet created my Crisis Plan and have been offered a copy of this plan    Behavioral Health Treatment Plan [facility]: Diagnosis and Treatment Plan explained to Sabrina Gaines  acknowledges an understanding of their diagnosis. Sabrina Gaines agrees to this treatment plan.     I have been offered a copy of this Treatment Plan. yes

## 2023-10-12 NOTE — TELEPHONE ENCOUNTER
Rescheduled patient's virtual appointment with Steven Parada LCSW from 10/26/2023 to 10/24/2023 due to provider out of office.

## 2023-10-24 ENCOUNTER — TELEMEDICINE (OUTPATIENT)
Dept: BEHAVIORAL/MENTAL HEALTH CLINIC | Facility: CLINIC | Age: 23
End: 2023-10-24
Payer: COMMERCIAL

## 2023-10-24 DIAGNOSIS — F33.1 MODERATE RECURRENT MAJOR DEPRESSION (HCC): ICD-10-CM

## 2023-10-24 DIAGNOSIS — F41.1 GAD (GENERALIZED ANXIETY DISORDER): Primary | ICD-10-CM

## 2023-10-24 PROCEDURE — 90834 PSYTX W PT 45 MINUTES: CPT | Performed by: SOCIAL WORKER

## 2023-10-24 NOTE — PSYCH
Virtual Regular Visit    Verification of patient location:    Patient is located at Home in the following state in which I hold an active license [facility]. Patient states they are at home address with privacy. Writer is calling from private office at [facility], [address] [place] [address]. Patient identifies by name,  and address. Behavioral Health Psychotherapy Progress Note    Psychotherapy Provided: Individual Psychotherapy     1. JOSELYN (generalized anxiety disorder)        2. Moderate recurrent major depression ([address])            Goals addressed in session: Goal 1     DATA: Focus of session was on connection of thoughts, feelings, behaviors, automatic negative thoughts and psychoeducation on thinking errors, connection to anxiety, identifying distortions and how they show up in his life, growth mind set, benefits. Identified some of the experiences, factors that resulted in anxious thinking patterns. Also developed Crisis Plan. Jose Martin Vazquez had meeting with his boss who told him he was doing better in his job and how to improve. He has been anxious at work when another co worker keeps talking politics and about [address] potential. He relates he tries to listen to music, ignore and coworker has been talked to several times about going on his political rants. Jose Martin Vazquez continues to be very anxious and isolating to avoid possibility of seeing former girlfriend whose significant mental health issues overwhelmed him. Jose Martin Vazquez understands Laurel Royal is leaving practice and that he is on a list to continue being seen and is a priority as an established patient. During this session, this clinician used the following therapeutic modalities: Client-centered Therapy and Cognitive Behavioral Therapy    Substance Abuse was not addressed during this session.  If the client is diagnosed with a co-occurring substance use disorder, please indicate any changes in the frequency or amount of use: NA. Stage of change for addressing substance use diagnoses: No substance use/Not applicable    ASSESSMENT:  Ese De Leon presents with a Euthymic/ normal and Anxious mood. his affect is Normal range and intensity, which is congruent, with his mood and the content of the session. The client has made progress on their goals. Brandy Camacho is oriented x4, cooperative attitude, memory in tact, thought processes logical, alert, coherent speech, fair insight, fair judgment. Ese De Leon presents with a minimal risk of suicide, minimal risk of self-harm, and minimal risk of harm to others. For any risk assessment that surpasses a "low" rating, a safety plan must be developed. A safety plan was indicated: no  If yes, describe in detail NA    PLAN: Between sessions, Ese De Leon will look up growth mind set and common cognitive distortions, start to recognize which ones and frequency he uses. Behavioral Health Treatment Plan and Discharge Planning: Ese De Leon is aware of and agrees to continue to work on their treatment plan. They have identified and are working toward their discharge goals. yes    Visit start and stop times:    10/25/23  Start Time: 1000  Stop Time: 1052  Total Visit Time: 52 minutes      . Tyrel Mcqueen [address] [place] [address]    Patient Name Ese De Leon     Date of Birth: 23 y.o. 2000      MRN: [id number]    Admission Date:  9/28/2023    Date of Transfer: October 25, 2023    Admission Diagnosis:     Generalized Anxiety Disorder  Moderate Recurrent Major Depression    Current Diagnosis:     1. JOSELYN (generalized anxiety disorder)        2. Moderate recurrent major depression ([address])            Reason for Admission: Brandy Camacho presented for treatment due to anxiety and depressoni. Primary complaints included ANXIETY SYMPTOMS: daily anxiety symptoms, worrying daily, anxiety in social situations, panic attacks.  Depressive symptoms include little interest in doing things he previously enjoyed, over sleeping, isolating, feels down about himself. Progress in Treatment: Elisa Sim was seen for Individual Couseling. During the course of treatment he engaged well, displayed good motivation. Episodes of Higher Level of Care: No    Transfer request Initiated by: Psychiatrist: Toño Therapist: Calin Mercado LCSW    Reason for Transfer Request: clinician leaving practice    Does this individual need a clinician with specialized training/expertise?: No, client participates virtually    Is this client working [facility] other [facility]?  Psychiatrist: None Therapist: Calin Mercado LCSW    Other pertinent issues: None    Are there any specific individuals who would be a “best fit” or who have already agreed to accept this transfer request?      Psychiatrist: None   Therapist: None  Rationale: Not Applicable    Attempts to maintain the current therapeutic relationship: Not Applicable    Transfer request routed to Clinical Supervisor for input and/or approval.     Comments from other involved providers and/or clinical coordinator : Not Applicable    Calin Mercado LCSW10/25/23

## 2023-10-24 NOTE — BH CRISIS PLAN
Client Name: Christa Dwyer       Client YOB: 2000  : 2000    Treatment Team (include name and contact information):     Psychotherapist: Adela Loyd LCSW    Psychiatrist: Mental health medications being managed/prescribed Dr. Jessica Butts, PCP     Release of information completed:       Healthcare Provider  Martir Aaron DO  [address]  [phone]    Type of Plan   * Child plans (children 15 yo and younger) must be completed and signed by the child's legal guardian   * Plans for all individuals 15 yo and above must be signed by the client. Plan Type:  Initial    My Personal Strengths are (in the client's own words):  Hard working  Curious  Multiple interests  Good friend  Good sense of humor   Creative     The stressors and triggers that may put me at risk are:    Politics  - such as has a coworker who bring up politics all the time, last week talking about World War III  Coming in to contact with former girlfriend    Coping skills I can use to keep myself calm and safe:  Listen to music when at work  Try to distract myself - play video games, read  Turning to supports    Coping skills/supports I can use to maintain abstinence from substance use:   Not Applicable    The people that provide me with help and support: (Include name, contact, and how they can help)     Support person #1: My parents - mom and step dad    * Phone number: live with them    * How can they help me? Helping find coping mechanisms to reduce anxiety     Support person #2:Alverto LEAL (best friend)    * Phone number: in cell     * How can they help me?  Helps distract, puts things in perspective, helps me to move away from anxious feelings    In the past, the following has helped me in times of crisis:    Listening to music, maria elena, reading   Talking to my supports   Distracting myself      If it is an emergency and you need immediate help, call     If there is a possibility of danger to yourself or others, call the following crisis hotline resources:     Adult Crisis Numbers  Suicide Prevention Hotline - Dial 9-8-8  [facility]: [address]: [address]: [address]: [phone]  [address] [place] [address] Street: [phone]  [facility]: [address]: [address]: [phone] (daytime). [phone] (after hours, weekends, holidays)     Child/Adolescent Crisis Numbers   [facility] WOMEN'S AND CHILDREN'S [facility]: [address]: [phone]   Yvon Callas: [phone]   [address] [place] [address] Street: [phone]    Please note: Some [facility] do not have a separate number for Child/Adolescent specific crisis. If your county is not listed under Child/Adolescent, please call the adult number for your county     National Talk to Text Line   All Ages - [phone]    In the event your feelings become unmanageable, and you cannot reach your support system, you will call 911 immediately or go to the nearest hospital emergency room.

## 2023-10-24 NOTE — BH CRISIS PLAN
Client Name: Sabrina Gaines       Client YOB: 2000  : 2000    Treatment Team (include name and contact information):     Psychotherapist: ***    Psychiatrist: ***   Release of information completed: {YES/NO:}    " ***   Release of information completed: {YES/NO:}    Other (Specify Role): ***    Release of information completed: {YES/NO:}    Other (Specify Role): ***   Release of information completed: {YES/NO:}    Healthcare Provider  Jennifer Douglas DO  [address]  [phone]    Type of Plan   * Child plans (children 15 yo and younger) must be completed and signed by the child's legal guardian   * Plans for all individuals 15 yo and above must be signed by the client.      Plan Type: {AMB PSYCH/BH CHILD/ADULT:57761} {AMB PSYCH/BH Initial/update:45349}      My Personal Strengths are (in the client's own words):  "***"  The stressors and triggers that may put me at risk are:  {AMB PSYCH/BH Triggers/Stressors:89110}    Coping skills I can use to keep myself calm and safe:  {AMB PSYCH/BH COPING SKILLS:77962}    Coping skills/supports I can use to maintain abstinence from substance use:   {Substance Copin}    The people that provide me with help and support: (Include name, contact, and how they can help)   Support person #1: ***    * Phone number: {Support Person Phone:66389}    * How can they help me? ***   Support person #2:***    * Phone number: {Support Person Phone:23402}    * How can they help me? ***     Support person #3: ***    * Phone number: {Support Person Phone:86306}    * How can they help me? ***    In the past, the following has helped me in times of crisis:    {AMB PSYCH/BH Things that help:59673}      If it is an emergency and you need immediate help, call     If there is a possibility of danger to yourself or others, call the following crisis hotline resources:     Adult Crisis Numbers  Suicide Prevention Hotline - Dial   [facility]: [address]: [address]: [address]: [phone]  [address] [place] [address] Street: [phone]  [facility]:  St Sw: [address]: [phone] (daytime). [phone] (after hours, weekends, holidays)     Child/Adolescent Crisis Numbers   [facility] WOMEN'S AND CHILDREN'S [facility]: [address]: [phone]   Cris Vikash: [phone]   [address] [place] [address] Street: [phone]    Please note: Some [facility] do not have a separate number for Child/Adolescent specific crisis. If your county is not listed under Child/Adolescent, please call the adult number for your county     National Talk to Text Line   All Ages - [phone]    In the event your feelings become unmanageable, and you cannot reach your support system, you will call 911 immediately or go to the nearest hospital emergency room. Client Name: Jennifer Huddleston       Client YOB: 2000  : 2000    Treatment Team (include name and contact information):     Psychotherapist: ***    Psychiatrist: ***   Release of information completed: {YES/NO:}    " ***   Release of information completed: {YES/NO:}    Other (Specify Role): ***    Release of information completed: {YES/NO:}    Other (Specify Role): ***   Release of information completed: {YES/NO:}    Healthcare Provider  David Frances DO  [address]  [phone]    Type of Plan   * Child plans (children 15 yo and younger) must be completed and signed by the child's legal guardian   * Plans for all individuals 15 yo and above must be signed by the client.      Plan Type: {AMB PSYCH/BH CHILD/ADULT:07400} {AMB PSYCH/BH Initial/update:51877}      My Personal Strengths are (in the client's own words):  "***"  The stressors and triggers that may put me at risk are:  {AMB PSYCH/BH Triggers/Stressors:90912}    Coping skills I can use to keep myself calm and safe:  {AMB PSYCH/BH COPING SKILLS:84122}    Coping skills/supports I can use to maintain abstinence from substance use:   {Substance Copin}    The people that provide me with help and support: (Include name, contact, and how they can help)   Support person #1: ***    * Phone number: {Support Person Phone:38140}    * How can they help me? ***   Support person #2:***    * Phone number: {Support Person Phone:87684}    * How can they help me? ***     Support person #3: ***    * Phone number: {Support Person Phone:11060}    * How can they help me? ***    In the past, the following has helped me in times of crisis:    {AMB PSYCH/BH Things that help:04250}      If it is an emergency and you need immediate help, call     If there is a possibility of danger to yourself or others, call the following crisis hotline resources:     Adult Crisis Numbers  Suicide Prevention Hotline - Dial   [facility]: [address]: [address]: [address]: [phone]  [facility] Counties: [phone]  [facility]: [address] [place] [address] Sw: [address]: [phone] (daytime). [phone] (after hours, weekends, holidays)     Child/Adolescent Crisis Numbers   [facility]: [address]: [phone]   [facility] Board: [phone]   [address] [place] [address] Street: [phone]    Please note: Some counties do not have a separate number for Child/Adolescent specific crisis.  If your county is not listed under Child/Adolescent, please call the adult number for your county     National Talk to Text Line   All Ages - [phone]    In the event your feelings become unmanageable, and you cannot reach your support system, you will call 911 immediately or go to the nearest hospital emergency room.

## 2023-11-07 ENCOUNTER — TELEMEDICINE (OUTPATIENT)
Dept: BEHAVIORAL/MENTAL HEALTH CLINIC | Facility: CLINIC | Age: 23
End: 2023-11-07
Payer: COMMERCIAL

## 2023-11-07 DIAGNOSIS — F41.1 GAD (GENERALIZED ANXIETY DISORDER): ICD-10-CM

## 2023-11-07 DIAGNOSIS — F33.1 MODERATE RECURRENT MAJOR DEPRESSION (HCC): Primary | ICD-10-CM

## 2023-11-07 PROCEDURE — 90834 PSYTX W PT 45 MINUTES: CPT

## 2023-11-07 NOTE — PSYCH
Behavioral Health Psychotherapy Progress Note    Psychotherapy Provided: Individual Psychotherapy     1. Moderate recurrent major depression ([address])        2. JOSELYN (generalized anxiety disorder)            Goals addressed in session: Goal 1     DATA: This was the first session writer had with Fahad Cason. Writer asked how mateus felt with his former therapist leaving. Mateus explained it was not an issue, and he had to get used to being in therapy again after many years of not being in therapy, so adjusting to a new therapist was "fine". Writer reviewed with clálvaro what prior therapist and him discussed in his initial session, reviewed his treatment plan, and confirmed that he had crisis plan. Fahad Cason explained he struggles with anxiety, used to take medications but found they weren't helpful, but reported that he can cope with his anxiety and also panic attacks. Mateus is working but would like to find a new job, his current job is a stressor. Mateus plans to look into working at [address] [place] [address]. Mateus talked about how much he enjoys reading, video games, comic books, spending time with his best friend Claudia Bright, watching movies and listening to podcasts. Mateus just got his 's license yesterday, which he is happy about. Mateus could not get his license when he turned 19 due to the COVID restrictions. He celebrated his 23st birthday on 10[birth year]30 and went to a bar with his friend. Denied any problems with alcohol but had a drink. Mateus would like to continue virtual rather than in-person, as he lives in [place] and does not always have access to the car (his dad uses it for work daily). Mateus grew up in [place] and talked about how different it is from [place]; he does not have access to stores and shopping, as stores are not within walking distance like they were when he lived here. Jaylent reported he gets nervous driving on the highway. He is not sure why he gets nervous, but prefers back roads.      Clt and writer agreed on the scheduled times and days to meet for sessions. Next session scheduled and confirmed. During this session, this clinician used the following therapeutic modalities: Engagement Strategies, Client-centered Therapy, and Supportive Psychotherapy    Substance Abuse was not addressed during this session. If the client is diagnosed with a co-occurring substance use disorder, please indicate any changes in the frequency or amount of use:   . Stage of change for addressing substance use diagnoses: No substance use/Not applicable    ASSESSMENT:  Mckenna Penn presents with a Euthymic/ normal mood. his affect is Normal range and intensity, which is congruent, with his mood and the content of the session. The client has made progress on their goals. Mckenna Penn presents with a none risk of suicide, none risk of self-harm, and none risk of harm to others. For any risk assessment that surpasses a "low" rating, a safety plan must be developed. A safety plan was indicated: no  If yes, describe in detail       PLAN: Between sessions, Mckenna Penn will attempt to locate a new job. At the next session, the therapist will use Engagement Strategies, Client-centered Therapy, and Supportive Psychotherapy to address job stress. Behavioral Health Treatment Plan and Discharge Planning: Mckenna Penn is aware of and agrees to continue to work on their treatment plan. They have identified and are working toward their discharge goals.  yes    Visit start and stop times:    11/07/23       Virtual Regular Visit    Verification of patient location:    Patient is located at Home in the following state in which I hold an active license [place]      Assessment/Plan:    Problem List Items Addressed This Visit          Other    JOSELYN (generalized anxiety disorder)    Moderate recurrent major depression ([address]) - Primary       Goals addressed in session: Goal 1          Reason for visit is   Chief Complaint   Patient presents with    Virtual Regular Visit          [facility]     Provider located at [address] [place] [address] Dr Villareal Iain  [address] [facility]  [address]  [address] [place] [address]  [phone]      Recent Visits  No visits were found meeting these conditions. Showing recent visits within past 7 days and meeting all other requirements  Today's Visits  Date Type Provider Dept   11/07/23 1300 [address],  [facility] Therapist Tiana   Showing today's visits and meeting all other requirements  Future Appointments  No visits were found meeting these conditions. Showing future appointments within next 150 days and meeting all other requirements       The patient was identified by name and date of birth. Walter Regan was informed that this is a telemedicine visit and that the visit is being conducted throughthe DabKick platform. He agrees to proceed. .  My office door was closed. No one else was in the room. He acknowledged consent and understanding of privacy and security of the video platform. The patient has agreed to participate and understands they can discontinue the visit at any time. Patient is aware this is a billable service. Subjective  Walter Regan is a 26 y.o. male    .       HPI     Past Medical History:   Diagnosis Date    Anxiety     Chronic nasal congestion     Earache, chronic     Migraine     Orthodontics     Premature baby     Wears eyeglasses        Past Surgical History:   Procedure Laterality Date    ACHILLES TENDON LENGTHENING Bilateral     AIRWAY SURGERY  2003    make airway larger with cartilage    BRONCHOSCOPY      Last assessed 02/07/17    CARDIAC SURGERY      MYRINGOTOMY      Last assessed 02/07/17    NASAL SEPTOPLASTY W/ TURBINOPLASTY Bilateral 6/27/2019    Procedure: TURBINATE SUBMUCOUS RESECTION, REPAIR NASAL VESTIBULAR STENOSIS;  Surgeon: Mehrdad Campos MD; Location: [facility];  Service: ENT    NASAL/SINUS ENDOSCOPY N/A 6/27/2019    Procedure: IMAGED GUIDED FESS, ENDOSCOPIC MAXILLARY ANTROSTOMY & TOTAL ETHMOIDECTOMY;  Surgeon: Carlos Domingo MD;  Location: [facility];  Service: ENT    PATENT DUCTUS ARTERIOUS LIGATION      Trans catheter closure       Current Outpatient Medications   Medication Sig Dispense Refill    chlorhexidine (PERIDEX) 0.12 % solution  (Patient not taking: Reported on 8/21/2023)      DULoxetine (CYMBALTA) 60 mg delayed release capsule Take 1 capsule (60 mg total) by mouth daily In am 90 capsule 1    neomycin-polymyxin-hydrocortisone (CORTISPORIN) otic solution Administer 3 drops to the right ear every 8 (eight) hours for 7 days 10 mL 0     No current facility-administered medications for this visit. No Known Allergies    Review of Systems    Video Exam    There were no vitals filed for this visit.     Physical Exam     Visit Time    Visit Start Time: 8016  Visit Stop Time: 2798  Total Visit Duration:  40 minutes

## 2023-11-21 ENCOUNTER — TELEMEDICINE (OUTPATIENT)
Dept: BEHAVIORAL/MENTAL HEALTH CLINIC | Facility: CLINIC | Age: 23
End: 2023-11-21
Payer: COMMERCIAL

## 2023-11-21 DIAGNOSIS — F41.1 GAD (GENERALIZED ANXIETY DISORDER): ICD-10-CM

## 2023-11-21 DIAGNOSIS — F33.1 MODERATE RECURRENT MAJOR DEPRESSION (HCC): Primary | ICD-10-CM

## 2023-11-21 PROCEDURE — 90834 PSYTX W PT 45 MINUTES: CPT

## 2023-11-21 NOTE — BH CRISIS PLAN
Client Name: Patti Hernandez       Client YOB: 2000  : 2000    Treatment Team (include name and contact information):     Psychotherapist: Kera Lockhart    Psychiatrist: none   Release of information completed: no      Healthcare Provider  Irma Milian DO   [facility] [address]   [phone]    Type of Plan   * Child plans (children 15 yo and younger) must be completed and signed by the child's legal guardian   * Plans for all individuals 15 yo and above must be signed by the client. Plan Type: adolescent/adult (15 and over) Initial      My Personal Strengths are (in the client's own words):  "[place], good at my job, Im funny"  The stressors and triggers that may put me at risk are:  chronic anxiety and chronic mental illness    Coping skills I can use to keep myself calm and safe:  Call a friend or family member    Coping skills/supports I can use to maintain abstinence from substance use:   Not Applicable    The people that provide me with help and support: (Include name, contact, and how they can help)   Support person #1: mom    * Phone number: in cell phone    * How can they help me? Help me financially, listen to me   Support person #2:Alverto    * Phone number: in cell phone    * How can they help me? He would distract me      In the past, the following has helped me in times of crisis:    Other: listening to podcast, sleeping      If it is an emergency and you need immediate help, call     If there is a possibility of danger to yourself or others, call the following crisis hotline resources:     Adult Crisis Numbers  Suicide Prevention Hotline - Dial   [facility]: [address] [facility] [address] Street: [address]: [address]: [phone]  [address] [place] [address] Street: [address]: [address]: [address]: [phone] (daytime). [phone] (after hours, weekends, holidays)     Child/Adolescent Crisis Numbers   [facility] WOMEN'S AND CHILDREN'S [facility]: [address]: [phone]   Slava Ro[address]r: [phone]   [facility]: [phone]    Please note: Some [facility] do not have a separate number for Child/Adolescent specific crisis. If your county is not listed under Child/Adolescent, please call the adult number for your county     National Talk to Text Line   All Bbq[employer] - [phone]    In the event your feelings become unmanageable, and you cannot reach your support system, you will call 911 immediately or go to the nearest hospital emergency room.     CLIENT PROVIDED VERBAL CONSENT AS SESSION WAS VIRTUAL
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
## 2023-12-05 ENCOUNTER — TELEMEDICINE (OUTPATIENT)
Dept: BEHAVIORAL/MENTAL HEALTH CLINIC | Facility: CLINIC | Age: 23
End: 2023-12-05
Payer: COMMERCIAL

## 2023-12-05 DIAGNOSIS — F33.1 MODERATE RECURRENT MAJOR DEPRESSION (HCC): Primary | ICD-10-CM

## 2023-12-05 DIAGNOSIS — F41.1 GAD (GENERALIZED ANXIETY DISORDER): ICD-10-CM

## 2023-12-05 PROCEDURE — 90834 PSYTX W PT 45 MINUTES: CPT
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
## 2023-12-19 ENCOUNTER — TELEMEDICINE (OUTPATIENT)
Dept: BEHAVIORAL/MENTAL HEALTH CLINIC | Facility: CLINIC | Age: 23
End: 2023-12-19
Payer: COMMERCIAL

## 2023-12-19 DIAGNOSIS — F41.1 GAD (GENERALIZED ANXIETY DISORDER): ICD-10-CM

## 2023-12-19 DIAGNOSIS — F33.1 MODERATE RECURRENT MAJOR DEPRESSION (HCC): Primary | ICD-10-CM

## 2023-12-19 PROCEDURE — 90834 PSYTX W PT 45 MINUTES: CPT

## 2023-12-19 NOTE — PSYCH
Behavioral Health Psychotherapy Progress Note    Psychotherapy Provided: Individual Psychotherapy     1. Moderate recurrent major depression (HCC)        2. 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 He explained that his mom will write down everything he needs to ask, for him. Writer asked clt if he is capable of doing this independently, which he stated he is. Clt knows the questions he needs to ask, such as whether he can do classes virtaully as he does not have a car. Clt talked about anxiety with work as he will be working independently, and is nervous about that, like doing tasks and getting overwhelmed.     Clt reported he read the CBT and REBT pamphlet writer emailed him. We reviewed this together and discussed examples to apply to the worksheet. Together, clt and writer completed the worksheet, as clt had not yet done so on his own, but may now have a better understanding of how to do so.   When anxious doesn't remember, has memory issues, he's not sure what this stems from, perhaps short term rather than long term.     Clt reported what he is doing for [employer],and that he is excited. He will be with his younger step-brother, who will be excited and he enjoys seeing him happy and pleased. His friend Alverto and him will be seeing Joann, which is another thing he looks forward to. Clt is usually quiet the first half of the session but when talking about things like music and movies he opens up more and is more talkative. We discussed how listening to music improves his mood, makes him feel happy.    Substance Abuse was not addressed during this session. If the client is diagnosed with a co-occurring substance use disorder, please indicate any changes in the frequency or amount of use: none. Stage of change for addressing substance use diagnoses: No substance  "use/Not applicable    ASSESSMENT:  Yvon Kerr presents with a Euthymic/ normal mood.     his affect is Normal range and intensity, which is congruent, with his mood and the content of the session. The client has made progress on their goals.     Yvon Kerr presents with a none risk of suicide, none risk of self-harm, and none risk of harm to others.    For any risk assessment that surpasses a \"low\" rating, a safety plan must be developed.    A safety plan was indicated: no  If yes, describe in detail       PLAN: Between sessions, Yvon Kerr will review the worksheets and practice learned techniques, like setting reminders and writing things down. At the next session, the therapist will use Engagement Strategies, Client-centered Therapy, Cognitive Behavioral Therapy, and Supportive Psychotherapy to address anxiety and fears.    Behavioral Health Treatment Plan and Discharge Planning: Yvon Kerr is aware of and agrees to continue to work on their treatment plan. They have identified and are working toward their discharge goals. yes    Visit start and stop times:    12/19/23        Virtual Regular Visit    Verification of patient location:    Patient is located at Home in the following state in which I hold an active license [place]      Assessment/Plan:    Problem List Items Addressed This Visit       JOSELYN (generalized anxiety disorder)    Moderate recurrent major depression (HCC) - Primary       Goals addressed in session: Goal 1          Reason for visit is   Chief Complaint   Patient presents with    Virtual Regular Visit          Encounter provider MAYA Fraser    Provider located at [facility]IST [facility] TH[facility] [address]  [address]  [facility] [address] [phone]      Recent Visits  No visits were found meeting these conditions.  Showing recent visits within past 7 days and meeting all other " requirements  Today's Visits  Date Type Provider Dept   12/19/23 Telemedicine MAYA Fraser [facility] [place]   Showing today's visits and meeting all other requirements  Future Appointments  No visits were found meeting these conditions.  Showing future appointments within next 150 days and meeting all other requirements       The patient was identified by name and date of birth. Yvon Kerr was informed that this is a telemedicine visit and that the visit is being conducted throughthe [employer] platform. He agrees to proceed..  My office door was closed. No one else was in the room.  He acknowledged consent and understanding of privacy and security of the video platform. The patient has agreed to participate and understands they can discontinue the visit at any time.    Patient is aware this is a billable service.     Subjective  Yvon Kerr is a 23 y.o. male    .      HPI     Past Medical History:   Diagnosis Date    Anxiety     Chronic nasal congestion     Earache, chronic     Migraine     Orthodontics     Premature baby     Wears eyeglasses        Past Surgical History:   Procedure Laterality Date    ACHILLES TENDON LENGTHENING Bilateral     AIRWAY SURGERY  2003    make airway larger with cartilage    BRONCHOSCOPY      Last assessed 02/07/17    CARDIAC SURGERY      MYRINGOTOMY      Last assessed 02/07/17    NASAL SEPTOPLASTY W/ TURBINOPLASTY Bilateral 6/27/2019    Procedure: TURBINATE SUBMUCOUS RESECTION, REPAIR NASAL VESTIBULAR STENOSIS;  Surgeon: Naseem Carlson MD;  [facility];  Service: ENT    NASAL/SINUS ENDOSCOPY N/A 6/27/2019    Procedure: IMAGED GUIDED FESS, ENDOSCOPIC MAXILLARY ANTROSTOMY & TOTAL ETHMOIDECTOMY;  Surgeon: Naseem Carlson MD;  [facility];  Service: ENT    PATENT DUCTUS ARTERIOUS LIGATION      Trans catheter closure       Current Outpatient Medications   Medication Sig Dispense Refill    chlorhexidine (PERIDEX) 0.12 %  solution  (Patient not taking: Reported on 8/21/2023)      DULoxetine (CYMBALTA) 60 mg delayed release capsule Take 1 capsule (60 mg total) by mouth daily In am 90 capsule 1    neomycin-polymyxin-hydrocortisone (CORTISPORIN) otic solution Administer 3 drops to the right ear every 8 (eight) hours for 7 days 10 mL 0     No current facility-administered medications for this visit.        No Known Allergies    Review of Systems    Video Exam    There were no vitals filed for this visit.    Physical Exam     Visit Time    Visit Start Time: 1205   Visit Stop Time: 1250  Total Visit Duration:  45 minutes

## 2024-01-02 ENCOUNTER — TELEMEDICINE (OUTPATIENT)
Dept: BEHAVIORAL/MENTAL HEALTH CLINIC | Facility: CLINIC | Age: 24
End: 2024-01-02
Payer: COMMERCIAL

## 2024-01-02 DIAGNOSIS — F41.1 GAD (GENERALIZED ANXIETY DISORDER): ICD-10-CM

## 2024-01-02 DIAGNOSIS — F64.0 GENDER DYSPHORIA IN ADULT: ICD-10-CM

## 2024-01-02 DIAGNOSIS — F33.1 MODERATE RECURRENT MAJOR DEPRESSION (HCC): Primary | ICD-10-CM

## 2024-01-02 PROCEDURE — 90834 PSYTX W PT 45 MINUTES: CPT
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
## 2024-01-23 ENCOUNTER — TELEMEDICINE (OUTPATIENT)
Dept: BEHAVIORAL/MENTAL HEALTH CLINIC | Facility: CLINIC | Age: 24
End: 2024-01-23
Payer: COMMERCIAL

## 2024-01-23 DIAGNOSIS — F64.0 GENDER DYSPHORIA IN ADULT: ICD-10-CM

## 2024-01-23 DIAGNOSIS — F33.1 MODERATE RECURRENT MAJOR DEPRESSION (HCC): Primary | ICD-10-CM

## 2024-01-23 DIAGNOSIS — F41.1 GAD (GENERALIZED ANXIETY DISORDER): ICD-10-CM

## 2024-01-23 PROCEDURE — 90834 PSYTX W PT 45 MINUTES: CPT

## 2024-01-23 NOTE — PSYCH
Virtual Regular Visit    Verification of patient location:    Patient is located at Home in the following state in which I hold an active license [place]      Assessment/Plan:    Problem List Items Addressed This Visit       JOSELYN (generalized anxiety disorder)    Moderate recurrent major depression (HCC) - Primary    Gender dysphoria in adult       Goals addressed in session: Goal 1          Reason for visit is No chief complaint on file.       Encounter provider MAYA Fraser    Provider located at [facility] [place]  [address]  [address]  [facility] [address] [phone]      Recent Visits  No visits were found meeting these conditions.  Showing recent visits within past 7 days and meeting all other requirements  Future Appointments  No visits were found meeting these conditions.  Showing future appointments within next 150 days and meeting all other requirements       The patient was identified by name and date of birth. Yvon Kerr was informed that this is a telemedicine visit and that the visit is being conducted throughthe [employer] platform. He agrees to proceed..  My office door was closed. No one else was in the room.  He acknowledged consent and understanding of privacy and security of the video platform. The patient has agreed to participate and understands they can discontinue the visit at any time.    Patient is aware this is a billable service.     Subjective  Yvon Kerr is a 23 y.o. male    .      HPI     Past Medical History:   Diagnosis Date    Anxiety     Chronic nasal congestion     Earache, chronic     Migraine     Orthodontics     Premature baby     Wears eyeglasses        Past Surgical History:   Procedure Laterality Date    ACHILLES TENDON LENGTHENING Bilateral     AIRWAY SURGERY  2003    make airway larger with cartilage    BRONCHOSCOPY      Last assessed 02/07/17    CARDIAC SURGERY       MYRINGOTOMY      Last assessed 02/07/17    NASAL SEPTOPLASTY W/ TURBINOPLASTY Bilateral 6/27/2019    Procedure: TURBINATE SUBMUCOUS RESECTION, REPAIR NASAL VESTIBULAR STENOSIS;  Surgeon: Naseem Carlson MD;  [facility];  Service: ENT    NASAL/SINUS ENDOSCOPY N/A 6/27/2019    Procedure: IMAGED GUIDED FESS, ENDOSCOPIC MAXILLARY ANTROSTOMY & TOTAL ETHMOIDECTOMY;  Surgeon: Naseem Carlson MD;  [facility];  Service: ENT    PATENT DUCTUS ARTERIOUS LIGATION      Trans catheter closure       Current Outpatient Medications   Medication Sig Dispense Refill    chlorhexidine (PERIDEX) 0.12 % solution  (Patient not taking: Reported on 8/21/2023)      DULoxetine (CYMBALTA) 60 mg delayed release capsule Take 1 capsule (60 mg total) by mouth daily In am 90 capsule 1    neomycin-polymyxin-hydrocortisone (CORTISPORIN) otic solution Administer 3 drops to the right ear every 8 (eight) hours for 7 days 10 mL 0     No current facility-administered medications for this visit.        No Known Allergies    Review of Systems    Video Exam    There were no vitals filed for this visit.    Physical Exam     Visit Time    Visit Start Time: 103  Visit Stop Time: 141  Total Visit Duration:  38 minutes

## 2024-01-23 NOTE — PSYCH
Behavioral Health Psychotherapy Progress Note    Psychotherapy Provided: Individual Psychotherapy     1. Moderate recurrent major depression (HCC)        2. Gender dysphoria in adult        3. JOSELYN (generalized anxiety disorder)            Goals addressed in session: Goal 1     DATA: Yvon reported feeling stressed about school. Aside from this he stated he can't think of anything to talk about. Writer encouraged clt to elaborate. Writer asked if clt has been utilizng REBT and CBT worksheets, which clt reported he has. Writer stressed importance of applying what he learned from these, to his anxiety. Clt elaborated as to why he is nervous about school, that he won't get work done. Doesn't have enough skill to write an essay, he thinks. Is aware of resources and contacting teacher as needed. Together we discussed cognitive distortions like projecting and all or nothing thinking. He is aware of this thought pattern and will try to decrease these thoughts and replace them with more realistic factual thoughts.   Clt spoke a lot during the sesion of his enjoyment with following music and going to record stores. He ancknowledged this helps him feel happy. Writer made clt aware that during these sessions, he can feel free and comforable to discuss anxiety and concerns as it appears that he is resistant to elabortate on the topic. Clt understood.     During this session, this clinician used the following therapeutic modalities: Engagement Strategies, Client-centered Therapy, Cognitive Behavioral Therapy, and Supportive Psychotherapy    Substance Abuse was not addressed during this session. If the client is diagnosed with a co-occurring substance use disorder, please indicate any changes in the frequency or amount of use:   . Stage of change for addressing substance use diagnoses: No substance use/Not applicable    ASSESSMENT:  Yvon Kerr presents with a Euthymic/ normal mood.     his affect is Normal range and  "intensity, which is congruent, with his mood and the content of the session. The client has made progress on their goals.       Yvon Kerr presents with a none risk of suicide, none risk of self-harm, and none risk of harm to others.    For any risk assessment that surpasses a \"low\" rating, a safety plan must be developed.    A safety plan was indicated: no  If yes, describe in detail       PLAN: Between sessions, Yvon Kerr will continue to work on REBT and CBT worksheets and think of what he wants to dicuss during session. At the next session, the therapist will use Engagement Strategies, Client-centered Therapy, Cognitive Behavioral Therapy, and Supportive Psychotherapy to address anxiety and depression.    Behavioral Health Treatment Plan and Discharge Planning: Yvon Kerr is aware of and agrees to continue to work on their treatment plan. They have identified and are working toward their discharge goals. yes    Visit start and stop times:    01/23/24    Start Time: 0103  "

## 2024-02-13 ENCOUNTER — TELEMEDICINE (OUTPATIENT)
Dept: BEHAVIORAL/MENTAL HEALTH CLINIC | Facility: CLINIC | Age: 24
End: 2024-02-13

## 2024-02-13 DIAGNOSIS — F41.1 GAD (GENERALIZED ANXIETY DISORDER): Primary | ICD-10-CM

## 2024-02-13 DIAGNOSIS — F33.1 MODERATE RECURRENT MAJOR DEPRESSION (HCC): ICD-10-CM

## 2024-02-13 DIAGNOSIS — F64.0 GENDER DYSPHORIA IN ADULT: ICD-10-CM

## 2024-02-13 NOTE — PSYCH
"Behavioral Health Psychotherapy Progress Note    Psychotherapy Provided: Individual Psychotherapy     1. JOSELYN (generalized anxiety disorder)        2. Moderate recurrent major depression (HCC)        3. Gender dysphoria in adult            Goals addressed in session: Goal 1     DATA: Yvon said he has been feeling anxious lately. He used to take zoloft and cymbalta years ago but said they  didn't help. Clt wishes to work with a psychiatrist for medication management, writer will refer.   Clt reported he withdrew from school, it is too overwhelming and with work too, and he does not feel he does best with \"school stuff\" and structure of learning through school. Plan is to work on getting into an [employer] school in [place]. Clt discussed having a work issue and jumping to conclusions, thinking he was going to get fired. We utilized cognitive behavioral techniques to identify irrational thoughts and replacing them with rational thoughts. Clt said he will try apps for CBT.  Client reported he did not have much to talk about aside from this.     During this session, this clinician used the following therapeutic modalities: Engagement Strategies, Client-centered Therapy, Cognitive Behavioral Therapy, Motivational Interviewing, and Supportive Psychotherapy    Substance Abuse was not addressed during this session. If the client is diagnosed with a co-occurring substance use disorder, please indicate any changes in the frequency or amount of use:   . Stage of change for addressing substance use diagnoses: No substance use/Not applicable    ASSESSMENT:  Yvon Kerr presents with a Euthymic/ normal and Anxious mood.     his affect is Normal range and intensity and Constricted, which is congruent, with his mood and the content of the session. The client has made progress on their goals.       Yvon Kerr presents with a none risk of suicide, none risk of self-harm, and none risk of harm to " "others.    For any risk assessment that surpasses a \"low\" rating, a safety plan must be developed.    A safety plan was indicated: no  If yes, describe in detail       PLAN: Between sessions, Yvon Kerr will utilized CBT apps and practice changing negative thoughts. At the next session, the therapist will use Engagement Strategies, Cognitive Behavioral Therapy, and Supportive Psychotherapy to address irrational fears.    Behavioral Health Treatment Plan and Discharge Planning: Yvon Kerr is aware of and agrees to continue to work on their treatment plan. They have identified and are working toward their discharge goals. yes    Visit start and stop times:    02/13/24  Start Time: 1202  Stop Time: 1241  Total Visit Time: 39 minutes    Virtual Regular Visit    Verification of patient location:    Patient is located at Home in the following state in which I hold an active license [place]      Assessment/Plan:    Problem List Items Addressed This Visit       JOSELYN (generalized anxiety disorder) - Primary    Moderate recurrent major depression (HCC)    Gender dysphoria in adult       Goals addressed in session: Goal 1          Reason for visit is   Chief Complaint   Patient presents with    Virtual Regular Visit          Encounter provider MAYA Fraser    Provider located at [facility] [address]  [address]  [facility] [address] [phone]      Recent Visits  No visits were found meeting these conditions.  Showing recent visits within past 7 days and meeting all other requirements  Today's Visits  Date Type Provider Dept   02/13/24 Telemedicine MAYA Fraser  [facility] [place]   Showing today's visits and meeting all other requirements  Future Appointments  No visits were found meeting these conditions.  Showing future appointments within next 150 days and meeting all other " requirements       The patient was identified by name and date of birth. Yvon Kerr was informed that this is a telemedicine visit and that the visit is being conducted throughthe [employer] platform. He agrees to proceed..  My office door was closed. No one else was in the room.  He acknowledged consent and understanding of privacy and security of the video platform. The patient has agreed to participate and understands they can discontinue the visit at any time.    Patient is aware this is a billable service.     Subjective  Yvon Kerr is a 23 y.o. male  .      HPI     Past Medical History:   Diagnosis Date    Anxiety     Chronic nasal congestion     Earache, chronic     Migraine     Orthodontics     Premature baby     Wears eyeglasses        Past Surgical History:   Procedure Laterality Date    ACHILLES TENDON LENGTHENING Bilateral     AIRWAY SURGERY  2003    make airway larger with cartilage    BRONCHOSCOPY      Last assessed 02/07/17    CARDIAC SURGERY      MYRINGOTOMY      Last assessed 02/07/17    NASAL SEPTOPLASTY W/ TURBINOPLASTY Bilateral 6/27/2019    Procedure: TURBINATE SUBMUCOUS RESECTION, REPAIR NASAL VESTIBULAR STENOSIS;  Surgeon: Naseem Carlson MD;  [facility];  Service: ENT    NASAL/SINUS ENDOSCOPY N/A 6/27/2019    Procedure: IMAGED GUIDED FESS, ENDOSCOPIC MAXILLARY ANTROSTOMY & TOTAL ETHMOIDECTOMY;  Surgeon: Naseem Carlson MD;  [facility];  Service: ENT    PATENT DUCTUS ARTERIOUS LIGATION      Trans catheter closure       Current Outpatient Medications   Medication Sig Dispense Refill    chlorhexidine (PERIDEX) 0.12 % solution  (Patient not taking: Reported on 8/21/2023)      DULoxetine (CYMBALTA) 60 mg delayed release capsule Take 1 capsule (60 mg total) by mouth daily In am 90 capsule 1    neomycin-polymyxin-hydrocortisone (CORTISPORIN) otic solution Administer 3 drops to the right ear every 8 (eight) hours for 7 days 10 mL 0     No current  facility-administered medications for this visit.        No Known Allergies    Review of Systems    Video Exam    There were no vitals filed for this visit.    Physical Exam     Visit Time    Visit Start Time: 1202  Visit Stop Time: 1241  Total Visit Duration:  39 minutes

## 2024-02-14 ENCOUNTER — TELEPHONE (OUTPATIENT)
Dept: PSYCHIATRY | Facility: CLINIC | Age: 24
End: 2024-02-14

## 2024-02-14 NOTE — TELEPHONE ENCOUNTER
IC called pt after receiving message that pt wants to schedule a med management appt. IC left a voice message for pt and pt will be added to the wait list.

## 2024-02-16 NOTE — TELEPHONE ENCOUNTER
IC called pt from wait list and left voice message about scheduling a med management appt in the '[facility] office.

## 2024-02-20 ENCOUNTER — TELEPHONE (OUTPATIENT)
Dept: PSYCHIATRY | Facility: CLINIC | Age: 24
End: 2024-02-20

## 2024-02-20 NOTE — TELEPHONE ENCOUNTER
"Behavioral Health Outpatient Intake Questions    Referred By   : Magali Jimenez    Please advise interviewee that they need to answer all questions truthfully to allow for best care, and any misrepresentations of information may affect their ability to be seen at this clinic   => Was this discussed? Yes     If Minor Child (under age 18)    Who is/are the legal guardian(s) of the child?     Is there a custody agreement?      If \"YES\"- Custody orders must be obtained prior to scheduling the first appointment  In addition, Consent to Treatment must be signed by all legal guardians prior to scheduling the first appointment    If \"NO\"- Consent to Treatment must be signed by all legal guardians prior to scheduling the first appointment    Behavioral Health Outpatient Intake History -     Presenting Problem (in patient's own words): pt's anxiety is getting really bad    Are there any communication barriers for this patient?     No                                               If yes, please describe barriers:   If there is a unique situation, please refer to Sourav Cha/Jessica Smalls for final determination.    Are you taking any psychiatric medications? No     If \"YES\" -What are they      If \"YES\" -Who prescribes? Pt has an appt with PCP on 3/14/2024 to see about starting medication    Has the Patient previously received outpatient Talk Therapy or Medication Management from [facility]'s  Yes        If \"YES\"- When, Where and with Whom? Currently sees Magali Jimenez        If \"NO\" -Has Patient received these services elsewhere?       If \"YES\" -When, Where, and with Whom?pt saw a psyciatrist when age 16 years    Has the Patient abused alcohol or other substances in the last 6 months ? No  No concerns of substance abuse are reported.     If \"YES\" -What substance, How much, How often?     If illegal substance: Refer to [facility] (for HILARIO) or [facility] Offices.   If Alcohol in excess of 10 drinks per week:  Refer to [facility] " "[facility] (for HILARIO) or [facility] Offices    Legal History-     Is this treatment court ordered? No   If \"yes \"send to :  Talk Therapy : Send to Sourav Smalls for final determination   Med Management: Send to Dr Berry for final determination     Has the Patient been convicted of a felony?  No   If \"Yes\" send to -When, What?  Talk Therapy : Send to Sourav Smalls for final determination   Med Management: Send to Dr Berry for final determination     ACCEPTED as a patient Yes  If \"Yes\" Appointment Date: with Eileen Correa  Thursday, March 28, 2024 @ 10:00am  Thursday, April 25, 2024 @ 5:00pm    Referred Elsewhere? No  If “Yes” - (Where? Ex: [facility], [facility], [facility], [facility], etc.)       Name of Insurance Co: [employer] [facility]  Insurance ID# [id number]  Insurance Phone #   If ins is primary or secondary? primary  If patient is a minor, parents information such as Name, D.O.B of guarantor.    RTE for appts can be ran as of 2/29/2024.  "

## 2024-02-20 NOTE — TELEPHONE ENCOUNTER
IC called pt from wait list and left voice message about scheduling a med management appt in the [place] office.  3rd attempt to schedule with pt. Pt will be removed from wait list at this time.

## 2024-02-21 PROBLEM — Z00.00 HEALTHCARE MAINTENANCE: Status: RESOLVED | Noted: 2018-06-19 | Resolved: 2024-02-21

## 2024-02-27 ENCOUNTER — TELEMEDICINE (OUTPATIENT)
Dept: BEHAVIORAL/MENTAL HEALTH CLINIC | Facility: CLINIC | Age: 24
End: 2024-02-27
Payer: COMMERCIAL

## 2024-02-27 DIAGNOSIS — F41.1 GAD (GENERALIZED ANXIETY DISORDER): ICD-10-CM

## 2024-02-27 DIAGNOSIS — F64.0 GENDER DYSPHORIA IN ADULT: ICD-10-CM

## 2024-02-27 DIAGNOSIS — F33.1 MODERATE RECURRENT MAJOR DEPRESSION (HCC): Primary | ICD-10-CM

## 2024-02-27 PROCEDURE — 90834 PSYTX W PT 45 MINUTES: CPT
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
## 2024-03-12 ENCOUNTER — TELEMEDICINE (OUTPATIENT)
Dept: BEHAVIORAL/MENTAL HEALTH CLINIC | Facility: CLINIC | Age: 24
End: 2024-03-12
Payer: COMMERCIAL

## 2024-03-12 DIAGNOSIS — F33.1 MODERATE RECURRENT MAJOR DEPRESSION (HCC): Primary | ICD-10-CM

## 2024-03-12 DIAGNOSIS — F64.0 GENDER DYSPHORIA IN ADULT: ICD-10-CM

## 2024-03-12 DIAGNOSIS — F41.1 GAD (GENERALIZED ANXIETY DISORDER): ICD-10-CM

## 2024-03-12 PROCEDURE — 90834 PSYTX W PT 45 MINUTES: CPT

## 2024-03-12 NOTE — PSYCH
"Behavioral Health Psychotherapy Progress Note    Psychotherapy Provided: Individual Psychotherapy     1. Moderate recurrent major depression (HCC)        2. JOSELYN (generalized anxiety disorder)        3. Gender dysphoria in adult            Goals addressed in session: Goal 1     DATA: Yvon had trouble connecting on virtual session. He did find that using a different browser works. 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 They reportedly had a disagreement due to Andrea's behavior. Clt states he is rude to others. Clt explained this led him to feel like it's hard to even call him a friend. We spoke about relationship patterns, specifically, in friendships, and how the patterns are with his close friends  Clt stated his anxiety \"gets really bad\". 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   Writer inquired about how clt is coping with the anxiety and what he is doing to work with the feeling and thoughts that exacerbate it. Clt   During this session, this clinician used the following therapeutic modalities: Engagement Strategies, Client-centered Therapy, and Supportive Psychotherapy    Substance Abuse was not addressed during this session. If the client is diagnosed with a co-occurring substance use disorder, please indicate any changes in the frequency or amount of use:   . Stage of change for addressing substance use diagnoses: No substance use/Not applicable    ASSESSMENT:  Yvon Kerr presents with a Anxious mood.     his affect is Constricted, which is congruent, with his mood and the content of the session. The client has not made progress on their goals.Clt has little motivation in between sessions to work on CBT techniques and needs redirection during sessions to focus on his anxiety, especially wnen reserved and tired, as he comes to session after just waking up.  Writer suggested clt " "consider waking up earlier, clt understood.       Yvon Kerr presents with a none risk of suicide, none risk of self-harm, and none risk of harm to others.    For any risk assessment that surpasses a \"low\" rating, a safety plan must be developed.    A safety plan was indicated: no  If yes, describe in detail       PLAN: Between sessions, Yvon Kerr will identify factors in his friendships that lead him to feel anxious and depressed. At the next session, the therapist will use Engagement Strategies, Client-centered Therapy, Cognitive Behavioral Therapy, Motivational Interviewing, and Supportive Psychotherapy to address anxiety, depression.    Behavioral Health Treatment Plan and Discharge Planning: Yvon Kerr is aware of and agrees to continue to work on their treatment plan. They have identified and are working toward their discharge goals. yes    Visit start and stop times:    03/12/24    Start Time: 1200  Stop Time: 1246  Total Visit Time: 46 minutes    Virtual Regular Visit    Verification of patient location:    Patient is located at Home in the following state in which I hold an active license [place]      Assessment/Plan:    Problem List Items Addressed This Visit       JOSELYN (generalized anxiety disorder)    Moderate recurrent major depression (HCC) - Primary    Gender dysphoria in adult       Goals addressed in session: Goal 1          Reason for visit is   Chief Complaint   Patient presents with    Virtual Regular Visit          Encounter provider MAYA Fraser    Provider located at [facility] [place]  [address] [place] [address]  [address]  [facility] [address] [phone]      Recent Visits  No visits were found meeting these conditions.  Showing recent visits within past 7 days and meeting all other requirements  Today's Visits  Date Type Provider Dept   03/12/24 Telemedicine [facility] [place]   Showing today's visits and meeting all other requirements  Future Appointments  No visits were found meeting these conditions.  Showing future appointments within next 150 days and meeting all other requirements       The patient was identified by name and date of birth. Yvon Kerr was informed that this is a telemedicine visit and that the visit is being conducted throughthe [employer] platform. He agrees to proceed..  My office door was closed. No one else was in the room.  He acknowledged consent and understanding of privacy and security of the video platform. The patient has agreed to participate and understands they can discontinue the visit at any time.    Patient is aware this is a billable service.     Subjective  Yvon Kerr is a 23 y.o. male    .      HPI     Past Medical History:   Diagnosis Date    Anxiety     Chronic nasal congestion     Earache, chronic     Migraine     Orthodontics     Premature baby     Wears eyeglasses        Past Surgical History:   Procedure Laterality Date    ACHILLES TENDON LENGTHENING Bilateral     AIRWAY SURGERY  2003    make airway larger with cartilage    BRONCHOSCOPY      Last assessed 02/07/17    CARDIAC SURGERY      MYRINGOTOMY      Last assessed 02/07/17    NASAL SEPTOPLASTY W/ TURBINOPLASTY Bilateral 6/27/2019    Procedure: TURBINATE SUBMUCOUS RESECTION, REPAIR NASAL VESTIBULAR STENOSIS;  Surgeon: Naseem Carlson MD;  [facility];  Service: ENT    NASAL/SINUS ENDOSCOPY N/A 6/27/2019    Procedure: IMAGED GUIDED FESS, ENDOSCOPIC MAXILLARY ANTROSTOMY & TOTAL ETHMOIDECTOMY;  Surgeon: Naseem Carlson MD;  [facility];  Service: ENT    PATENT DUCTUS ARTERIOUS LIGATION      Trans catheter closure       Current Outpatient Medications   Medication Sig Dispense Refill    DULoxetine (CYMBALTA) 60 mg delayed release capsule Take 1 capsule (60 mg total) by mouth daily In am 90 capsule 1     neomycin-polymyxin-hydrocortisone (CORTISPORIN) otic solution Administer 3 drops to the right ear every 8 (eight) hours for 7 days 10 mL 0     No current facility-administered medications for this visit.        No Known Allergies    Review of Systems    Video Exam    There were no vitals filed for this visit.    Physical Exam     Visit Time    Visit Start Time: 1200  Visit Stop Time: 1246  Total Visit Duration:  46 minutes

## 2024-03-14 ENCOUNTER — OFFICE VISIT (OUTPATIENT)
Dept: FAMILY MEDICINE CLINIC | Facility: CLINIC | Age: 24
End: 2024-03-14
Payer: COMMERCIAL

## 2024-03-14 ENCOUNTER — TELEPHONE (OUTPATIENT)
Dept: FAMILY MEDICINE CLINIC | Facility: CLINIC | Age: 24
End: 2024-03-14

## 2024-03-14 VITALS
SYSTOLIC BLOOD PRESSURE: 116 MMHG | RESPIRATION RATE: 18 BRPM | HEART RATE: 68 BPM | TEMPERATURE: 97.3 F | WEIGHT: 232.2 LBS | BODY MASS INDEX: 35.19 KG/M2 | DIASTOLIC BLOOD PRESSURE: 90 MMHG | HEIGHT: 68 IN

## 2024-03-14 DIAGNOSIS — F41.1 GAD (GENERALIZED ANXIETY DISORDER): Primary | ICD-10-CM

## 2024-03-14 DIAGNOSIS — F41.1 GAD (GENERALIZED ANXIETY DISORDER): ICD-10-CM

## 2024-03-14 DIAGNOSIS — E66.9 OBESITY (BMI 30-39.9): ICD-10-CM

## 2024-03-14 PROCEDURE — 99214 OFFICE O/P EST MOD 30 MIN: CPT | Performed by: FAMILY MEDICINE
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
## 2024-03-14 NOTE — PATIENT INSTRUCTIONS
Even though the zoloft prescription says 100mg per day, please start at 50mg per day for the first 2 weeks.  If you start to feel better, stay on the 50mg per day.  Otherwise go up to the 100mg.    You do not have to wean the duloxetine, just switch to zoloft the next day.

## 2024-03-14 NOTE — TELEPHONE ENCOUNTER
PT would like to change the pharmacy that prescription was sent to. [facility] [id number] [facility] [id number]. [phone]. Would like to make primary pharmacy.

## 2024-03-26 ENCOUNTER — TELEPHONE (OUTPATIENT)
Dept: PSYCHIATRY | Facility: CLINIC | Age: 24
End: 2024-03-26

## 2024-03-26 NOTE — TELEPHONE ENCOUNTER
Patient is calling regarding cancelling an appointment.    Date/Time: 3/26/2024 @ 12    Reason: Patient was called into work  We conf next appt 4/9.      Patient was rescheduled: YES [] NO [x]  If yes, when was Patient reschedule for:     Patient requesting call back to reschedule: YES [] NO [x]

## 2024-03-28 ENCOUNTER — OFFICE VISIT (OUTPATIENT)
Dept: PSYCHIATRY | Facility: CLINIC | Age: 24
End: 2024-03-28
Payer: COMMERCIAL

## 2024-03-28 DIAGNOSIS — F43.9 TRAUMA AND STRESSOR-RELATED DISORDER: ICD-10-CM

## 2024-03-28 DIAGNOSIS — F33.1 MODERATE RECURRENT MAJOR DEPRESSION (HCC): Primary | ICD-10-CM

## 2024-03-28 DIAGNOSIS — F41.1 GAD (GENERALIZED ANXIETY DISORDER): ICD-10-CM

## 2024-03-28 PROCEDURE — 90792 PSYCH DIAG EVAL W/MED SRVCS: CPT | Performed by: PHYSICIAN ASSISTANT
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
## 2024-04-03 ENCOUNTER — TELEPHONE (OUTPATIENT)
Dept: PSYCHIATRY | Facility: CLINIC | Age: 24
End: 2024-04-03

## 2024-04-09 ENCOUNTER — TELEMEDICINE (OUTPATIENT)
Dept: BEHAVIORAL/MENTAL HEALTH CLINIC | Facility: CLINIC | Age: 24
End: 2024-04-09

## 2024-04-09 DIAGNOSIS — F64.0 GENDER DYSPHORIA IN ADULT: ICD-10-CM

## 2024-04-09 DIAGNOSIS — F41.1 GAD (GENERALIZED ANXIETY DISORDER): Primary | ICD-10-CM

## 2024-04-09 DIAGNOSIS — F33.1 MODERATE RECURRENT MAJOR DEPRESSION (HCC): ICD-10-CM

## 2024-04-09 NOTE — PSYCH
"Behavioral Health Psychotherapy Progress Note    Psychotherapy Provided: Individual Psychotherapy     1. JOSELYN (generalized anxiety disorder)        2. Gender dysphoria in adult        3. Moderate recurrent major depression (HCC)            Goals addressed in session: Goal 1     DATA: Yvon missed last session, informed writer \"a lot of stuff went on\" but things are okay overall. Clt reports that there have been some disagreements between self and some acquaintances in the music community on social media.. Clt reports he wants to take a break from this community in particular. Clt reports this incident led to a clt having a panic attack at work, which he thinks erupted from feeling anger, but no physical symptoms. Writer inquired about any physical sx, clt denied having any. Writer inquired about what pt utilized to calm self. Clt said he was at work but was able to listen to music, \"that's like static\" and was helpful. Clt denied any SI/HI thoughts, or self-harm.     Clt further elaborated, explaining the incident still bothers him. Writer inquired about clt's anger, as he stated a few sessions prior that he has anger in regards to his father. Clt was somewhat dismissive and went on to further discuss artists in the DIY music community, then said \"but it's over and done with, Im over it'. Writer asked clt what else he is anger about, as we had started to discuss this emotion in particular, and writer explained that this is something writer was curious to learn more about. Clt stated he has animosity about this because he stepped in and got involved, as he did not think anyone else would stick up for his friend Bon. Clt continued to voice frustration about \"fame, power and the DIY music community\". Writer asked if there was anything else clt feels angry about (as clt continued to go on about this [employer] topic) but said he is \"over it\") Clt enied being angry about anything else.   Clt said he is applying " "for a new job, [employer] (computer work and intake of inventory), and has to \"stop smoking weed\" because he will have to get tested. Clt explained he smokes daily, and plans to continue since nothing bad happens. Writer inquired about anxiety and smoking, clt said it is helpful, he stopped smoking a week ago but plans to continue after the drug test. Writer asked if Eileen, his PA-C, is aware of him smoking pot, as he is also taking medications to help with his anxiety and depression. Clt stated Eileen is aware. Writer asked how client feels without smoking pot. Clt said he is not sure, as he is smoking pot very often.   Writer then brought up topic of clálvaro's gender identify, as clt mentioned this months prior as something he was struggling with. Clt said he did want to discuss this, as he has \"not come out\" and it is difficult. Clt explained his friend Marnie is also transgender, and he has been friends with her since they were teens; Marnie is very supportive and they have this in common which clt is glad about. Will further explore moving forward. Clt agreeable, understood.     During this session, this clinician used the following therapeutic modalities: Engagement Strategies, Client-centered Therapy, and Supportive Psychotherapy    Substance Abuse was not addressed during this session. If the client is diagnosed with a co-occurring substance use disorder, please indicate any changes in the frequency or amount of use:   . Stage of change for addressing substance use diagnoses: No substance use/Not applicable    ASSESSMENT:  Yvon Kerr presents with a Euthymic/ normal mood.     his affect is Normal range and intensity, which is congruent, with his mood and the content of the session. The client has made progress on their goals.       Yvon Kerr presents with a none risk of suicide, none risk of self-harm, and none risk of harm to others.    For any risk assessment that surpasses a \"low\" rating, a safety plan " must be developed.    A safety plan was indicated: no  If yes, describe in detail       PLAN: Between sessions, Yvon Kerr will reflect upon feelings of anger. At the next session, the therapist will use Engagement Strategies, Client-centered Therapy, and Supportive Psychotherapy to address gender identity, depression, anger.    Behavioral Health Treatment Plan and Discharge Planning: Yvon Kerr is aware of and agrees to continue to work on their treatment plan. They have identified and are working toward their discharge goals. yes    Visit start and stop times:    04/09/24    Start Time: 1205  Stop Time: 1247  Total Visit Time: 42 minutes    Virtual Regular Visit    Verification of patient location:    Patient is located at Home in the following state in which I hold an active license [place]      Assessment/Plan:    Problem List Items Addressed This Visit       JOSELYN (generalized anxiety disorder) - Primary    Moderate recurrent major depression (HCC)    Gender dysphoria in adult       Goals addressed in session: Goal 1          Reason for visit is   Chief Complaint   Patient presents with    Virtual Regular Visit          Encounter provider MAYA Fraser    Provider located at [facility] [address]  [address]  [facility] [id number] [phone]      Recent Visits  Date Type Provider Dept   04/03/24 Telephone MAYA Fraser [facility] [place]   Showing recent visits within past 7 days and meeting all other requirements  Today's Visits  Date Type Provider Dept   04/09/24 Telemedicine MAYA [facility] [place]   Showing today's visits and meeting all other requirements  Future Appointments  No visits were found meeting these conditions.  Showing future appointments within next 150 days and meeting all other requirements       The patient was identified  by name and date of birth. Yvon Kerr was informed that this is a telemedicine visit and that the visit is being conducted throughthe [employer] platform. He agrees to proceed..  My office door was closed. No one else was in the room.  He acknowledged consent and understanding of privacy and security of the video platform. The patient has agreed to participate and understands they can discontinue the visit at any time.    Patient is aware this is a billable service.     Subjective  Yvon Kerr is a 23 y.o. male    .      HPI     Past Medical History:   Diagnosis Date    Anxiety     Chronic nasal congestion     Earache, chronic     Migraine     Orthodontics     Premature baby     Wears eyeglasses        Past Surgical History:   Procedure Laterality Date    ACHILLES TENDON LENGTHENING Bilateral     AIRWAY SURGERY  2003    make airway larger with cartilage    BRONCHOSCOPY      Last assessed 02/07/17    CARDIAC SURGERY      MYRINGOTOMY      Last assessed 02/07/17    NASAL SEPTOPLASTY W/ TURBINOPLASTY Bilateral 6/27/2019    Procedure: TURBINATE SUBMUCOUS RESECTION, REPAIR NASAL VESTIBULAR STENOSIS;  Surgeon: Naseem Carlson MD;  Location: [facility];  Service: ENT    NASAL/SINUS ENDOSCOPY N/A 6/27/2019    Procedure: IMAGED GUIDED FESS, ENDOSCOPIC MAXILLARY ANTROSTOMY & TOTAL ETHMOIDECTOMY;  Surgeon: Naseem Carlson MD;  Location: [facility];  Service: ENT    PATENT DUCTUS ARTERIOUS LIGATION      Trans catheter closure       Current Outpatient Medications   Medication Sig Dispense Refill    neomycin-polymyxin-hydrocortisone (CORTISPORIN) otic solution Administer 3 drops to the right ear every 8 (eight) hours for 7 days 10 mL 0    sertraline (ZOLOFT) 50 mg tablet Take 1 tablet (50 mg total) by mouth daily 90 tablet 1     No current facility-administered medications for this visit.        No Known Allergies    Review of Systems    Video Exam    There were no vitals filed for this visit.    Physical Exam      Visit Time    Visit Start Time: 1205  Visit Stop Time: 1247  Total Visit Duration:  42 minutes

## 2024-04-23 ENCOUNTER — TELEMEDICINE (OUTPATIENT)
Dept: BEHAVIORAL/MENTAL HEALTH CLINIC | Facility: CLINIC | Age: 24
End: 2024-04-23
Payer: COMMERCIAL

## 2024-04-23 DIAGNOSIS — F33.1 MODERATE RECURRENT MAJOR DEPRESSION (HCC): ICD-10-CM

## 2024-04-23 DIAGNOSIS — F64.0 GENDER DYSPHORIA IN ADULT: ICD-10-CM

## 2024-04-23 DIAGNOSIS — F41.1 GAD (GENERALIZED ANXIETY DISORDER): Primary | ICD-10-CM

## 2024-04-23 PROCEDURE — 90834 PSYTX W PT 45 MINUTES: CPT

## 2024-04-25 ENCOUNTER — OFFICE VISIT (OUTPATIENT)
Dept: FAMILY MEDICINE CLINIC | Facility: CLINIC | Age: 24
End: 2024-04-25
Payer: COMMERCIAL

## 2024-04-25 VITALS
WEIGHT: 233.2 LBS | HEART RATE: 85 BPM | BODY MASS INDEX: 35.34 KG/M2 | DIASTOLIC BLOOD PRESSURE: 68 MMHG | HEIGHT: 68 IN | SYSTOLIC BLOOD PRESSURE: 118 MMHG | TEMPERATURE: 97.3 F | RESPIRATION RATE: 18 BRPM

## 2024-04-25 DIAGNOSIS — Z96.22 MYRINGOTOMY TUBE(S) STATUS: ICD-10-CM

## 2024-04-25 DIAGNOSIS — E66.9 OBESITY (BMI 30-39.9): ICD-10-CM

## 2024-04-25 DIAGNOSIS — F41.1 GAD (GENERALIZED ANXIETY DISORDER): Primary | ICD-10-CM

## 2024-04-25 PROCEDURE — 99214 OFFICE O/P EST MOD 30 MIN: CPT | Performed by: FAMILY MEDICINE

## 2024-04-25 NOTE — PATIENT INSTRUCTIONS
If you are willing, try 100mg of sertraline daily instead of 50mg to see if you have more benefit without side effects.  If it does not work out, then stay on the 50mg.  Please let me know if you end of preferring the 100mg so that I can refill the correct dose when you need it.    You could see the ENT again as your tube looks clogged especially on the right side.

## 2024-04-25 NOTE — PROGRESS NOTES
Assessment/Plan:    No problem-specific Assessment & Plan notes found for this encounter.    JOSELYN better on zoloft 50mg  Could retry 100mg for brief period to be sure benefit>side effects and let me know to refill    TM tubes with possible obstruction  Suggest ENT eval and f/u    Increase activity level for bmi 35    If you are willing, try 100mg of sertraline daily instead of 50mg to see if you have more benefit without side effects.  If it does not work out, then stay on the 50mg.  Please let me know if you end of preferring the 100mg so that I can refill the correct dose when you need it.    You could see the ENT again as your tube looks clogged especially on the right side.     Diagnoses and all orders for this visit:    JOSELYN (generalized anxiety disorder)    Myringotomy tube(s) status  -     Ambulatory Referral to Otolaryngology; Future    Obesity (BMI 30-39.9)    BMI 35.0-35.9,adult        No follow-ups on file.    Subjective:      Patient ID: Yvon Kerr is a 23 y.o. male.    Chief Complaint   Patient presents with    Follow-up     1 month f/u Arlette Alfaro LPN         HPI  Back of zoloft 50mg  Less anxious than before  100mg zoloft may have been cause of fatigue, more dose related than serotonin effect  Still tired  Mood better  Denies suicidal/homicidal/destructive ideations.  Work is ok  Ok but not necessarily good per pt    Has not seen ENT in a while  No fever  Keeps ears dry    The following portions of the patient's history were reviewed and updated as appropriate: allergies, current medications, past family history, past medical history, past social history, past surgical history and problem list.    Review of Systems   Constitutional:  Negative for chills and fever.         Current Outpatient Medications   Medication Sig Dispense Refill    neomycin-polymyxin-hydrocortisone (CORTISPORIN) otic solution Administer 3 drops to the right ear every 8 (eight) hours for 7 days 10 mL 0    sertraline  "(ZOLOFT) 50 mg tablet Take 1 tablet (50 mg total) by mouth daily 90 tablet 1     No current facility-administered medications for this visit.       Objective:    /68   Pulse 85   Temp (!) 97.3 °F (36.3 °C)   Resp 18   Ht 5' 8\" (1.727 m)   Wt 106 kg (233 lb 3.2 oz)   BMI 35.46 kg/m²        Physical Exam  Vitals and nursing note reviewed.   Constitutional:       Appearance: He is well-developed. He is obese. He is not ill-appearing.   HENT:      Head: Normocephalic.      Right Ear: External ear normal. There is no impacted cerumen.      Left Ear: External ear normal. There is no impacted cerumen.      Ears:      Comments: Right TM tube appears obstructed with exudate/wax, left TM tube not clearly visible  Eyes:      Conjunctiva/sclera: Conjunctivae normal.   Cardiovascular:      Rate and Rhythm: Normal rate and regular rhythm.      Heart sounds: No murmur heard.  Pulmonary:      Effort: Pulmonary effort is normal. No respiratory distress.      Breath sounds: No wheezing.   Abdominal:      Palpations: Abdomen is soft.   Musculoskeletal:         General: No deformity.      Cervical back: Neck supple.   Skin:     General: Skin is warm and dry.      Coloration: Skin is not pale.   Neurological:      Mental Status: He is alert.   Psychiatric:         Behavior: Behavior normal.         Thought Content: Thought content normal.                Warren Moreno DO    "

## 2024-04-26 ENCOUNTER — OFFICE VISIT (OUTPATIENT)
Dept: PSYCHIATRY | Facility: CLINIC | Age: 24
End: 2024-04-26
Payer: COMMERCIAL

## 2024-04-26 DIAGNOSIS — F41.1 GAD (GENERALIZED ANXIETY DISORDER): ICD-10-CM

## 2024-04-26 DIAGNOSIS — F43.9 TRAUMA AND STRESSOR-RELATED DISORDER: ICD-10-CM

## 2024-04-26 DIAGNOSIS — F33.1 MODERATE RECURRENT MAJOR DEPRESSION (HCC): Primary | ICD-10-CM

## 2024-04-26 PROCEDURE — 99214 OFFICE O/P EST MOD 30 MIN: CPT | Performed by: PHYSICIAN ASSISTANT

## 2024-04-26 NOTE — PSYCH
This note was not shared with the patient due to reasonable likelihood of causing patient harm   PROGRESS NOTE        [facility]      Name and Date of Birth:  Yvon Kerr 23 y.o. 2000    Date of Visit: 04/26/24    SUBJECTIVE:    Yvon presents today for medication management and follow-up.  He reports that his mother told him that he would have to start driving again.  He did take a drive in a parking lot but became anxious.  He reports that this has been stressful for him because he does not want to go against his mother's wishes but he does not feel ready to drive.    He does feel that the Zoloft has been helpful.  He is hesitant to increase the dose because he was off medication in the past and the 100 mg dosing made him tired.  He is sleeping well and getting adequate nutrition.  He is working on coping skills and therapy.    He denies suicidal ideation, intent or plan at present, has no suicidal ideation, intent or plan at present.    He denies any auditory hallucinations and visual hallucinations, denies any other delusional thinking, denies any delusional thinking.    He denies any side effects from medications  .  HPI ROS Appetite Changes and Sleep: normal appetite, normal sleep    Review Of Systems:      Constitutional Negative   ENT Negative   Cardiovascular Negative   Respiratory Negative   Gastrointestinal Negative   Genitourinary Negative   Musculoskeletal Negative   Integumentary Negative   Neurological Negative   Endocrine Negative   Other Symptoms Negative and None       Laboratory Results: No results found for this or any previous visit.    Substance Abuse History:    Social History     Substance and Sexual Activity   Drug Use No       Family Psychiatric History:     Family History   Problem Relation Age of Onset    Diabetes Father     Hypertension Paternal Grandmother     Cancer Family     No Known Problems Mother        The  following portions of the patient's history were reviewed and updated as appropriate: past family history, past medical history, past social history, past surgical history and problem list.    Social History     Socioeconomic History    Marital status: Single     Spouse name: Not on file    Number of children: Not on file    Years of education: Not on file    Highest education level: Not on file   Occupational History    Occupation: Student   Tobacco Use    Smoking status: Never     Passive exposure: Current    Smokeless tobacco: Never   Vaping Use    Vaping status: Never Used   Substance and Sexual Activity    Alcohol use: No    Drug use: No    Sexual activity: Not on file   Other Topics Concern    Not on file   Social History Narrative    Not on file     Social Determinants of Health     Financial Resource Strain: Not on file   Food Insecurity: Not on file   Transportation Needs: Not on file   Physical Activity: Not on file   Stress: Not on file   Social Connections: Not on file   Intimate Partner Violence: Not on file   Housing Stability: Not on file     Social History     Social History Narrative    Not on file        Social History       Tobacco History       Smoking Status  Never      Passive Exposure  Current      Smokeless Tobacco Use  Never              Alcohol History       Alcohol Use Status  No              Drug Use       Drug Use Status  No              Sexual Activity       Sexually Active  Not Asked              Activities of Daily Living    Not Asked                       OBJECTIVE:     Mental Status Evaluation:    Appearance age appropriate, casually dressed in [employer] uniform   Behavior Guarded, poor eye contact at times   Speech normal volume, normal pitch   Mood Anxious   Affect Mood congruent   Thought Processes logical   Associations intact associations   Thought Content normal   Perceptual Disturbances: none   Abnormal Thoughts  Risk Potential Suicidal ideation - None  Homicidal  ideation - None  Potential for aggression - No   Orientation oriented to person, place, time/date and situation   Memory recent and remote memory grossly intact   Cosciousness alert and awake   Attention Span attention span and concentration are age appropriate   Intellect Appears to be of Average Intelligence   Insight age appropriate    Judgement good    Muscle Strength and  Gait muscle strength and tone were normal   Language no difficulty naming common objects   Fund of Knowledge displays adequate knowledge of current events   Pain none   Pain Scale 0       Assessment/Plan:       Diagnoses and all orders for this visit:    Moderate recurrent major depression (HCC)    JOSELYN (generalized anxiety disorder)  -     sertraline (ZOLOFT) 50 mg tablet; Take 1 tablet (50 mg total) by mouth daily    Trauma and stressor-related disorder          Treatment Recommendations/Precautions:    Continue current medications:    Zoloft 50 mg for depression and anxiety      We will follow-up in 1-2 months or sooner if questions or concerns arise.  He is aware of emergent versus nonemergent mental health resources.  He is able to contract for his own safety at this time.  He will continue with his internal psychotherapist.       Risks/Benefits      Risks, Benefits And Possible Side Effects Of Medications:    Risks, benefits, and possible side effects of medications explained to patient and patient verbalizes understanding and agreement for treatment.    Controlled Medication Discussion:     Not applicable    Psychotherapy Provided:     Individual psychotherapy provided: No    Visit Start Time:  130 PM  Visit End Time:  150 PM  Total Visit Duration: 20 minutes    This note was completed in part utilizing Dragon dictation Software. Grammatical, translation, syntax errors, random word insertions, spelling mistakes, and incomplete sentences may be an occasional consequence of this system secondary to software limitations with voice  recognition, ambient noise, and hardware issues. If you have any questions or concerns about the content, text, or information contained within the body of this dictation, please contact the provider for clarification.

## 2024-05-07 ENCOUNTER — TELEMEDICINE (OUTPATIENT)
Dept: BEHAVIORAL/MENTAL HEALTH CLINIC | Facility: CLINIC | Age: 24
End: 2024-05-07
Payer: COMMERCIAL

## 2024-05-07 DIAGNOSIS — F64.0 GENDER DYSPHORIA IN ADULT: ICD-10-CM

## 2024-05-07 DIAGNOSIS — F41.1 GAD (GENERALIZED ANXIETY DISORDER): ICD-10-CM

## 2024-05-07 DIAGNOSIS — F33.1 MODERATE RECURRENT MAJOR DEPRESSION (HCC): Primary | ICD-10-CM

## 2024-05-07 PROCEDURE — 90832 PSYTX W PT 30 MINUTES: CPT

## 2024-05-07 NOTE — PSYCH
"Behavioral Health Psychotherapy Progress Note    Psychotherapy Provided: Individual Psychotherapy     1. Moderate recurrent major depression (HCC)        2. Gender dysphoria in adult        3. JOSELYN (generalized anxiety disorder)            Goals addressed in session: Goal 1     DATA: Yvon reports he is driving more, in parking lots, getting more comfortable. Writer provided supportive feedback, praised mateus on his efforts. Jaylent said he wants to eventually be able to drive to [place] and see his friend Ludy. He spoke about his feelings towards Ludy, how he has romantic feelings towards her. She is a lesbian but clt thinks the feelings are mutual. We talked about gender identity, his comfort level in being open with friends about identifying as trans, and that they are accepting. Writer inquired about gender and sexuality identities, for clarification for this writer. Mateus explained that he does not prefer to have \"labels\" but feels that although he identifies as female, he is atrracted to Ludy, and he thinks she may see him as male even though he identifies as female. He denies any concern in this area (gender identity) reports he has a good solid foundation of group of friends; Ludy, Alverto, Kayne, and Marnie.   Mateus reports things seem to be going better at work. He explained he did not smoke pot for 3 weeks when waiting for other job (which he did not get) but drank. We spoke about addiction and how mateus feels with daily use of pot. Mateus said he doesn't want to drink, as his birth father was an alcoholic, and smoking pot is safer. Writer again, as stated in last session, discussed being mindful of any problems that may arise from daily smoking of pot, as mateus reports he works, stays up late, plays video games with friends whil he is \"high\" and this is his routine.   Mateus said at this time, lately, things are going smoothly and he is pleased that he is making efforts to drive more.     During this session, this clinician " "used the following therapeutic modalities: Engagement Strategies, Client-centered Therapy, Cognitive Behavioral Therapy, Gender Affirmation Therapy, and Supportive Psychotherapy    Substance Abuse was not addressed during this session. If the client is diagnosed with a co-occurring substance use disorder, please indicate any changes in the frequency or amount of use:   . Stage of change for addressing substance use diagnoses: No substance use/Not applicable    ASSESSMENT:  Yvon Kerr presents with a Euthymic/ normal mood.     his affect is Normal range and intensity, which is congruent, with his mood and the content of the session. The client has made progress on their goals.       Yvon Kerr presents with a none risk of suicide, none risk of self-harm, and none risk of harm to others.    For any risk assessment that surpasses a \"low\" rating, a safety plan must be developed.    A safety plan was indicated: no  If yes, describe in detail       PLAN: Between sessions, Yvon Kerr will practice driving and decreasing anxiety. At the next session, the therapist will use Engagement Strategies and Client-centered Therapy to address new goals.    Behavioral Health Treatment Plan and Discharge Planning: Yvon Kerr is aware of and agrees to continue to work on their treatment plan. They have identified and are working toward their discharge goals. yes    Visit start and stop times:    05/07/24  Start Time: 1202  Stop Time: 1235  Total Visit Time: 33 minutes    Virtual Regular Visit    Verification of patient location:    Patient is located at Home in the following state in which I hold an active license [place]      Assessment/Plan:    Problem List Items Addressed This Visit       JOSELYN (generalized anxiety disorder)    Moderate recurrent major depression (HCC) - Primary    Gender dysphoria in adult       Goals addressed in session: Goal 1          Reason for visit is No chief complaint on file.   "     Encounter provider MAYA Fraser      Recent Visits  Date Type Provider Dept   05/07/24 Telemedicine MAYA Fraser [facility] Therapist [place]   Showing recent visits within past 7 days and meeting all other requirements  Future Appointments  No visits were found meeting these conditions.  Showing future appointments within next 150 days and meeting all other requirements       The patient was identified by name and date of birth. Yvon Kerr was informed that this is a telemedicine visit and that the visit is being conducted throughthe [employer] platform. He agrees to proceed..  My office door was closed. No one else was in the room.  He acknowledged consent and understanding of privacy and security of the video platform. The patient has agreed to participate and understands they can discontinue the visit at any time.    Patient is aware this is a billable service.     Subjective  Yvon Kerr is a 23 y.o. male    .      HPI     Past Medical History:   Diagnosis Date    Anxiety     Chronic nasal congestion     Earache, chronic     Migraine     Orthodontics     Premature baby     Wears eyeglasses        Past Surgical History:   Procedure Laterality Date    ACHILLES TENDON LENGTHENING Bilateral     AIRWAY SURGERY  2003    make airway larger with cartilage    BRONCHOSCOPY      Last assessed 02/07/17    CARDIAC SURGERY      MYRINGOTOMY      Last assessed 02/07/17    NASAL SEPTOPLASTY W/ TURBINOPLASTY Bilateral 6/27/2019    Procedure: TURBINATE SUBMUCOUS RESECTION, REPAIR NASAL VESTIBULAR STENOSIS;  Surgeon: Naseem Carlson MD;  Location: [facility];  Service: ENT    NASAL/SINUS ENDOSCOPY N/A 6/27/2019    Procedure: IMAGED GUIDED FESS, ENDOSCOPIC MAXILLARY ANTROSTOMY & TOTAL ETHMOIDECTOMY;  Surgeon: Naseem Carlson MD;  Location: [facility];  Service: ENT    PATENT DUCTUS ARTERIOUS LIGATION      Trans catheter closure       Current Outpatient Medications   Medication Sig  Dispense Refill    neomycin-polymyxin-hydrocortisone (CORTISPORIN) otic solution Administer 3 drops to the right ear every 8 (eight) hours for 7 days 10 mL 0    sertraline (ZOLOFT) 50 mg tablet Take 1 tablet (50 mg total) by mouth daily 90 tablet 1     No current facility-administered medications for this visit.        No Known Allergies    Review of Systems    Video Exam    There were no vitals filed for this visit.    Physical Exam     Visit Time    Visit Start Time: 1202  Visit Stop Time: 1235  Total Visit Duration:  33 minutes

## 2024-05-21 ENCOUNTER — TELEPHONE (OUTPATIENT)
Dept: PSYCHIATRY | Facility: CLINIC | Age: 24
End: 2024-05-21

## 2024-05-21 NOTE — TELEPHONE ENCOUNTER
Called patient but had to leave message due to patient no showing appointment for today with MAYA Fraser.  Left message on voicemail to call back to reschedule appointment.

## 2024-05-23 ENCOUNTER — TELEMEDICINE (OUTPATIENT)
Dept: BEHAVIORAL/MENTAL HEALTH CLINIC | Facility: CLINIC | Age: 24
End: 2024-05-23
Payer: COMMERCIAL

## 2024-05-23 DIAGNOSIS — F33.1 MODERATE RECURRENT MAJOR DEPRESSION (HCC): ICD-10-CM

## 2024-05-23 DIAGNOSIS — F64.0 GENDER DYSPHORIA IN ADULT: ICD-10-CM

## 2024-05-23 DIAGNOSIS — F41.1 GAD (GENERALIZED ANXIETY DISORDER): Primary | ICD-10-CM

## 2024-05-23 PROCEDURE — 90834 PSYTX W PT 45 MINUTES: CPT

## 2024-05-23 NOTE — PSYCH
"Behavioral Health Psychotherapy Progress Note    Psychotherapy Provided: Individual Psychotherapy     1. JOSELYN (generalized anxiety disorder)        2. Gender dysphoria in adult        3. Moderate recurrent major depression (HCC)            Goals addressed in session: Goal 1     DATA: Yvon discussed with writer and developed new treatment plan goals and objectives. Yvon reports he would like to eventually get to the point where he is more comfortable being open about his gender and sexuality, and hope to get an increase in supports. He states his main support at this time is his friend Alverto. Clt added that he started a relationship with his female friend Ludy. He states he \"thinks\" they are dating but it has not been officially confirmed. They are good friends and he apprecaites this. Mateus also reports he has been driving more often, in parking lot, and on his own since our last session. He hopes to be able to drive alone, on the road, by August. Writer provided supportive feedback and encouragement.     During this session, this clinician used the following therapeutic modalities: Engagement Strategies, Client-centered Therapy, Gender Affirmation Therapy, and Supportive Psychotherapy    Substance Abuse was not addressed during this session. If the client is diagnosed with a co-occurring substance use disorder, please indicate any changes in the frequency or amount of use:   . Stage of change for addressing substance use diagnoses: No substance use/Not applicable    ASSESSMENT:  Yvon Kerr presents with a Euthymic/ normal mood.     his affect is Normal range and intensity, which is congruent, with his mood and the content of the session. The client has made progress on their goals.       Yvon Kerr presents with a none risk of suicide, none risk of self-harm, and none risk of harm to others.    For any risk assessment that surpasses a \"low\" rating, a safety plan must be developed.    A safety plan " was indicated: no  If yes, describe in detail       PLAN: Between sessions, Yvon Kerr will work on driving alone. At the next session, the therapist will use Engagement Strategies, Client-centered Therapy, Gender Affirmation Therapy, and Supportive Psychotherapy to address new treatment goals and objectives, anxiety.    Behavioral Health Treatment Plan and Discharge Planning: Yvon Kerr is aware of and agrees to continue to work on their treatment plan. They have identified and are working toward their discharge goals. yes    Visit start and stop times:    05/23/24    Start Time: 1003  Stop Time: 1042  Total Visit Time: 39 minutes    Virtual Regular Visit    Verification of patient location:    Patient is located at Home in the following state in which I hold an active license [place]      Assessment/Plan:    Problem List Items Addressed This Visit       JOSELYN (generalized anxiety disorder) - Primary    Moderate recurrent major depression (HCC)    Gender dysphoria in adult       Goals addressed in session: Goal 1          Reason for visit is   Chief Complaint   Patient presents with    Virtual Regular Visit          Encounter provider MAYA Fraser      Recent Visits  Date Type Provider Dept   05/21/24 Telephone [facility]A Fraser [facility] [place]   05/21/24 Telemedicine MAY[facility] Therapist [place]   Showing recent visits within past 7 days and meeting all other requirements  Today's Visits  Date Type Provider Dept   05/23/24 Telemedicine MAYA Fraser [facility] Therapist [place]   Showing today's visits and meeting all other requirements  Future Appointments  No visits were found meeting these conditions.  Showing future appointments within next 150 days and meeting all other requirements       The patient was identified by name and date of birth. Yvon Kerr was informed that this is a telemedicine visit and that  the visit is being conducted throughthe [employer] platform. He agrees to proceed..  My office door was closed. No one else was in the room.  He acknowledged consent and understanding of privacy and security of the video platform. The patient has agreed to participate and understands they can discontinue the visit at any time.    Patient is aware this is a billable service.     Subjective  Yvon Kerr is a 23 y.o. male    .      HPI     Past Medical History:   Diagnosis Date    Anxiety     Chronic nasal congestion     Earache, chronic     Migraine     Orthodontics     Premature baby     Wears eyeglasses        Past Surgical History:   Procedure Laterality Date    ACHILLES TENDON LENGTHENING Bilateral     AIRWAY SURGERY  2003    make airway larger with cartilage    BRONCHOSCOPY      Last assessed 02/07/17    CARDIAC SURGERY      MYRINGOTOMY      Last assessed 02/07/17    NASAL SEPTOPLASTY W/ TURBINOPLASTY Bilateral 6/27/2019    Procedure: TURBINATE SUBMUCOUS RESECTION, REPAIR NASAL VESTIBULAR STENOSIS;  Surgeon: Naseem Carlson MD;  Location: [facility];  Service: ENT    NASAL/SINUS ENDOSCOPY N/A 6/27/2019    Procedure: IMAGED GUIDED FESS, ENDOSCOPIC MAXILLARY ANTROSTOMY & TOTAL ETHMOIDECTOMY;  Surgeon: Naseem Carlson MD;  Location: [facility];  Service: ENT    PATENT DUCTUS ARTERIOUS LIGATION      Trans catheter closure       Current Outpatient Medications   Medication Sig Dispense Refill    neomycin-polymyxin-hydrocortisone (CORTISPORIN) otic solution Administer 3 drops to the right ear every 8 (eight) hours for 7 days 10 mL 0    sertraline (ZOLOFT) 50 mg tablet Take 1 tablet (50 mg total) by mouth daily 90 tablet 1     No current facility-administered medications for this visit.        No Known Allergies    Review of Systems    Video Exam    There were no vitals filed for this visit.    Physical Exam     Visit Time    Visit Start Time: 1003  Visit Stop Time: 1042  Total Visit Duration:  39 minutes

## 2024-05-23 NOTE — BH TREATMENT PLAN
"Outpatient Behavioral Health Psychotherapy Treatment Plan    Yvon Kerr  2000     Date of Initial Psychotherapy Assessment: 8/29/23   Date of Current Treatment Plan: 05/23/24  Treatment Plan Target Date: 11/23/24  Treatment Plan Expiration Date: 11/23/24    Diagnosis:   1. JOSELYN (generalized anxiety disorder)        2. Gender dysphoria in adult        3. Moderate recurrent major depression (HCC)            Area(s) of Need: Family difficulties regarding clt's sexuality / gender    Long Term Goal 1 (in the client's own words): \"I'd like to work towards being open about my sexuality and gender, with people in general, including family    Stage of Change: Contemplation    Target Date for completion: 11/23/24     Anticipated therapeutic modalities: CBT, Client Centered, Solution Focused, Gender Affirming     People identified to complete this goal: Yvon, therapist Kerry      Objective 1: (identify the means of measuring success in meeting the objective): Yvon will identify 3 barriers in his relationships, that lead to difficulty with being open about his gender and sexuality      Objective 2: (identify the means of measuring success in meeting the objective): Yvon will work towards increasing number of supports from 1 to 3 persons or agencies.       I am currently under the care of a [facility] psychiatric provider: yes    My [facility] psychiatric provider is: Eileen Correa    I am currently taking psychiatric medications: Yes, as prescribed    I feel that I will be ready for discharge from mental health care when I reach the following (measurable goal/objective): When I feel comfortable about being open with others regarding my sexuality    For children and adults who have a legal guardian:   Has there been any change to custody orders and/or guardianship status? NA. If yes, attach updated documentation.    I have created my Crisis Plan and have been offered a copy of this plan    Behavioral Health " Treatment Plan [facility]: Diagnosis and Treatment Plan explained to Yvon Kerr acknowledges an understanding of their diagnosis. Yvon Kerr agrees to this treatment plan.    I have been offered a copy of this Treatment Plan. yes

## 2024-06-04 ENCOUNTER — TELEMEDICINE (OUTPATIENT)
Dept: BEHAVIORAL/MENTAL HEALTH CLINIC | Facility: CLINIC | Age: 24
End: 2024-06-04
Payer: COMMERCIAL

## 2024-06-04 DIAGNOSIS — F64.0 GENDER DYSPHORIA IN ADULT: ICD-10-CM

## 2024-06-04 DIAGNOSIS — F41.1 GAD (GENERALIZED ANXIETY DISORDER): Primary | ICD-10-CM

## 2024-06-04 DIAGNOSIS — F33.1 MODERATE RECURRENT MAJOR DEPRESSION (HCC): ICD-10-CM

## 2024-06-04 PROCEDURE — 90834 PSYTX W PT 45 MINUTES: CPT
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
## 2024-06-18 ENCOUNTER — TELEMEDICINE (OUTPATIENT)
Dept: BEHAVIORAL/MENTAL HEALTH CLINIC | Facility: CLINIC | Age: 24
End: 2024-06-18
Payer: COMMERCIAL

## 2024-06-18 DIAGNOSIS — F64.0 GENDER DYSPHORIA IN ADULT: ICD-10-CM

## 2024-06-18 DIAGNOSIS — F41.1 GAD (GENERALIZED ANXIETY DISORDER): ICD-10-CM

## 2024-06-18 DIAGNOSIS — F33.1 MODERATE RECURRENT MAJOR DEPRESSION (HCC): Primary | ICD-10-CM

## 2024-06-18 PROCEDURE — 90832 PSYTX W PT 30 MINUTES: CPT
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
## 2024-06-20 ENCOUNTER — OFFICE VISIT (OUTPATIENT)
Dept: PSYCHIATRY | Facility: CLINIC | Age: 24
End: 2024-06-20
Payer: COMMERCIAL

## 2024-06-20 DIAGNOSIS — F33.1 MODERATE RECURRENT MAJOR DEPRESSION (HCC): Primary | ICD-10-CM

## 2024-06-20 DIAGNOSIS — F41.1 GAD (GENERALIZED ANXIETY DISORDER): ICD-10-CM

## 2024-06-20 DIAGNOSIS — F43.9 TRAUMA AND STRESSOR-RELATED DISORDER: ICD-10-CM

## 2024-06-20 DIAGNOSIS — Z78.9 MALE-TO-FEMALE TRANSGENDER PERSON: ICD-10-CM

## 2024-06-20 PROCEDURE — 99214 OFFICE O/P EST MOD 30 MIN: CPT | Performed by: PHYSICIAN ASSISTANT

## 2024-06-20 RX ORDER — SERTRALINE HYDROCHLORIDE 100 MG/1
100 TABLET, FILM COATED ORAL DAILY
Qty: 90 TABLET | Refills: 1 | Status: SHIPPED | OUTPATIENT
Start: 2024-06-20 | End: 2024-12-17

## 2024-06-20 NOTE — PSYCH
"  This note was not shared with the patient due to reasonable likelihood of causing patient harm   PROGRESS NOTE        [facility]      Name and Date of Birth:  Yvon Kerr 23 y.o. 2000    Date of Visit: 06/20/24    SUBJECTIVE:    Yvon presents today for medication management and follow-up. Yvon shares that \"I came out to my parents.\" Yvon describes that there was a day when they became very stressed and called a friend to take them to crisis. After speaking with the friend, they decided to come out. Both parents were supportive and Yvon reports mood has improved since this occurred.     Yvon wants to discuss HRT today.     Yvon is sleeping and getting adequate nutrition.     He denies suicidal ideation, intent or plan at present, has no suicidal ideation, intent or plan at present.    He denies any auditory hallucinations and visual hallucinations, denies any other delusional thinking, denies any delusional thinking.    He denies any side effects from medications  .  HPI ROS Appetite Changes and Sleep: normal appetite, normal sleep    Review Of Systems:      Constitutional Negative   ENT Negative   Cardiovascular Negative   Respiratory Negative   Gastrointestinal Negative   Genitourinary Negative   Musculoskeletal Negative   Integumentary Negative   Neurological Negative   Endocrine Negative   Other Symptoms Negative and None       Laboratory Results: No results found for this or any previous visit.    Substance Abuse History:    Social History     Substance and Sexual Activity   Drug Use No       Family Psychiatric History:     Family History   Problem Relation Age of Onset    Diabetes Father     Hypertension Paternal Grandmother     Cancer Family     No Known Problems Mother        The following portions of the patient's history were reviewed and updated as appropriate: past family history, past medical history, past social history, " past surgical history and problem list.    Social History     Socioeconomic History    Marital status: Single     Spouse name: Not on file    Number of children: Not on file    Years of education: Not on file    Highest education level: Not on file   Occupational History    Occupation: Student   Tobacco Use    Smoking status: Never     Passive exposure: Current    Smokeless tobacco: Never   Vaping Use    Vaping status: Never Used   Substance and Sexual Activity    Alcohol use: No    Drug use: No    Sexual activity: Not on file   Other Topics Concern    Not on file   Social History Narrative    Not on file     Social Determinants of Health     Financial Resource Strain: Not on file   Food Insecurity: Not on file   Transportation Needs: Not on file   Physical Activity: Not on file   Stress: Not on file   Social Connections: Not on file   Intimate Partner Violence: Not on file   Housing Stability: Not on file     Social History     Social History Narrative    Not on file        Social History       Tobacco History       Smoking Status  Never      Passive Exposure  Current      Smokeless Tobacco Use  Never              Alcohol History       Alcohol Use Status  No              Drug Use       Drug Use Status  No              Sexual Activity       Sexually Active  Not Asked              Activities of Daily Living    Not Asked                       OBJECTIVE:     Mental Status Evaluation:    Appearance age appropriate, casually dressed    Behavior Pleasant, cooperative    Speech normal volume, normal pitch   Mood Anxious   Affect Mood congruent   Thought Processes logical   Associations intact associations   Thought Content normal   Perceptual Disturbances: none   Abnormal Thoughts  Risk Potential Suicidal ideation - None  Homicidal ideation - None  Potential for aggression - No   Orientation oriented to person, place, time/date and situation   Memory recent and remote memory grossly intact   Cosciousness alert and awake    Attention Span attention span and concentration are age appropriate   Intellect Appears to be of Average Intelligence   Insight age appropriate    Judgement good    Muscle Strength and  Gait muscle strength and tone were normal   Language no difficulty naming common objects   Fund of Knowledge displays adequate knowledge of current events   Pain none   Pain Scale 0       Assessment/Plan:       Diagnoses and all orders for this visit:    Moderate recurrent major depression (HCC)  -     sertraline (ZOLOFT) 100 mg tablet; Take 1 tablet (100 mg total) by mouth daily    Male-to-female transgender person  -     Ambulatory Referral to Endocrinology; Future    JOSELYN (generalized anxiety disorder)    Trauma and stressor-related disorder          Treatment Recommendations/Precautions:    Patient returned to previous Zoloft dosing (100 mg). He is tolerating well and would like to remain on that dosing.       Patient requesting referral to Endo for possible HRT    We will follow-up in 1-2 months or sooner if questions or concerns arise.  He is aware of emergent versus nonemergent mental health resources.  He is able to contract for his own safety at this time.  He will continue with his internal psychotherapist.       Risks/Benefits      Risks, Benefits And Possible Side Effects Of Medications:    Risks, benefits, and possible side effects of medications explained to patient and patient verbalizes understanding and agreement for treatment.    Controlled Medication Discussion:     Not applicable    Psychotherapy Provided:     Individual psychotherapy provided: No    Visit Start Time: 3:00 PM  Visit End Time: 3:20 PM  Total Visit Duration: 20 minutes    This note was completed in part utilizing Dragon dictation Software. Grammatical, translation, syntax errors, random word insertions, spelling mistakes, and incomplete sentences may be an occasional consequence of this system secondary to software limitations with voice  recognition, ambient noise, and hardware issues. If you have any questions or concerns about the content, text, or information contained within the body of this dictation, please contact the provider for clarification.

## 2024-07-02 ENCOUNTER — TELEMEDICINE (OUTPATIENT)
Dept: BEHAVIORAL/MENTAL HEALTH CLINIC | Facility: CLINIC | Age: 24
End: 2024-07-02
Payer: COMMERCIAL

## 2024-07-02 ENCOUNTER — TELEPHONE (OUTPATIENT)
Dept: PSYCHIATRY | Facility: CLINIC | Age: 24
End: 2024-07-02

## 2024-07-02 DIAGNOSIS — F33.1 MODERATE RECURRENT MAJOR DEPRESSION (HCC): Primary | ICD-10-CM

## 2024-07-02 DIAGNOSIS — F41.1 GAD (GENERALIZED ANXIETY DISORDER): ICD-10-CM

## 2024-07-02 DIAGNOSIS — F64.0 GENDER DYSPHORIA IN ADULT: ICD-10-CM

## 2024-07-02 PROCEDURE — 90832 PSYTX W PT 30 MINUTES: CPT

## 2024-07-02 NOTE — PSYCH
"Behavioral Health Psychotherapy Progress Note    Psychotherapy Provided: Individual Psychotherapy     1. Moderate recurrent major depression (HCC)        2. Gender dysphoria in adult        3. JOSELYN (generalized anxiety disorder)            Goals addressed in session: Goal 1     DATA: Yvon stated as soon as session started, \"Im going really good!\". Ct said he and Ludy are dating. He said financially he is struggling a little bit lately, in regards to having to spend money on the [employer] tickets this week. Ct said he is really looking forward to [employer]. 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  Ct said he has not been very anxious, things have been going pretty good. Ct states this is a nice change. Per ct, the last year was hard with work but , as he explained to Ludy, it feels nice to not be consumed with the anxiety. Writer inquired about what ct does when he gets anxious. Ct reports he doesn't feel like he needs to think about things as often, that lead to anxiety.   Writer inquired about what we discussed last session, regarding hormone therapy. Ct said he has endocrinologist appt in October and he is really happy about it. He wants to see if Ludy can visit during this time (she's in [place]) as she is very supportive of him . Ct stated he is \"optimistic\" and \"that's really it\". He explained that he has started talking to his coworkers more. Ct again stated, \"Im just doing really well!\". Writer provided supportive feedback. Writer asked ct how he feels therapy is going. Ct explained he is \"good for now\". Writer reviewed treatment plan goal, ct has accomplished latest goal, as he came out (about his gender identify) to his mother and she continues to be supportive.   Writer asked ct if he is implying that he would like to cut back with sessions. Ct stated he would. Writer explained that writer will not \"discharge\" ct but rather, put ct on cancellation list, should " "he feel the need to contact office if he wants to talk to therapist, schedule an appt. Ct said this would be alright.     During this session, this clinician used the following therapeutic modalities: Engagement Strategies, Client-centered Therapy, and Supportive Psychotherapy    Substance Abuse was not addressed during this session. If the client is diagnosed with a co-occurring substance use disorder, please indicate any changes in the frequency or amount of use:   . Stage of change for addressing substance use diagnoses: No substance use/Not applicable    ASSESSMENT:  Yvon Kerr presents with a Euthymic/ normal mood.     his affect is Normal range and intensity, which is congruent, with his mood and the content of the session. The client has made progress on their goals.       Yvon Kerr presents with a none risk of suicide, none risk of self-harm, and none risk of harm to others.    For any risk assessment that surpasses a \"low\" rating, a safety plan must be developed.    A safety plan was indicated: no  If yes, describe in detail       PLAN: Between sessions, Yvon Kerr will continue utilizing learned tools to cope with anxiety, and reach out to office to schedule therapy appts when and if needed. At the next session, the therapist will use Engagement Strategies and Client-centered Therapy to address new concerns.    Behavioral Health Treatment Plan and Discharge Planning: Yvon Kerr is aware of and agrees to continue to work on their treatment plan. They have identified and are working toward their discharge goals. yes    Visit start and stop times:    07/02/24    Start Time: 0600  Stop Time: 0622  Total Visit Time: 22 minutes    Virtual Regular Visit    Verification of patient location:    Patient is located at Home in the following state in which I hold an active license [place]      Assessment/Plan:    Problem List Items Addressed This Visit       JOSELYN (generalized anxiety disorder)    " Moderate recurrent major depression (HCC) - Primary    Gender dysphoria in adult       Goals addressed in session: Goal 1          Reason for visit is   Chief Complaint   Patient presents with    Virtual Regular Visit          Encounter provider MAYA Fraser      Recent Visits  No visits were found meeting these conditions.  Showing recent visits within past 7 days and meeting all other requirements  Today's Visits  Date Type Provider Dept   07/02/24 Telephone MAYA Fraser [facility]   07/02/24 Telemedicine MAYA Fraser [facility] [place]   Showing today's visits and meeting all other requirements  Future Appointments  No visits were found meeting these conditions.  Showing future appointments within next 150 days and meeting all other requirements       The patient was identified by name and date of birth. Yvon Kerr was informed that this is a telemedicine visit and that the visit is being conducted throughthe [employer] platform. He agrees to proceed..  My office door was closed. No one else was in the room.  He acknowledged consent and understanding of privacy and security of the video platform. The patient has agreed to participate and understands they can discontinue the visit at any time.    Patient is aware this is a billable service.     Subjective  Yvon Kerr is a 23 y.o. male    .      HPI     Past Medical History:   Diagnosis Date    Anxiety     Chronic nasal congestion     Earache, chronic     Migraine     Orthodontics     Premature baby     Wears eyeglasses        Past Surgical History:   Procedure Laterality Date    ACHILLES TENDON LENGTHENING Bilateral     AIRWAY SURGERY  2003    make airway larger with cartilage    BRONCHOSCOPY      Last assessed 02/07/17    CARDIAC SURGERY      MYRINGOTOMY      Last assessed 02/07/17    NASAL SEPTOPLASTY W/ TURBINOPLASTY Bilateral 6/27/2019    Procedure: TURBINATE SUBMUCOUS  RESECTION, REPAIR NASAL VESTIBULAR STENOSIS;  Surgeon: Naseem Carlson MD;  Location: [facility];  Service: ENT    NASAL/SINUS ENDOSCOPY N/A 6/27/2019    Procedure: IMAGED GUIDED FESS, ENDOSCOPIC MAXILLARY ANTROSTOMY & TOTAL ETHMOIDECTOMY;  Surgeon: Naseem Carlson MD;  Location: [facility];  Service: ENT    PATENT DUCTUS ARTERIOUS LIGATION      Trans catheter closure       Current Outpatient Medications   Medication Sig Dispense Refill    neomycin-polymyxin-hydrocortisone (CORTISPORIN) otic solution Administer 3 drops to the right ear every 8 (eight) hours for 7 days 10 mL 0    sertraline (ZOLOFT) 100 mg tablet Take 1 tablet (100 mg total) by mouth daily 90 tablet 1     No current facility-administered medications for this visit.        No Known Allergies    Review of Systems    Video Exam    There were no vitals filed for this visit.    Physical Exam     Visit Time    Visit Start Time: 0600  Visit Stop Time: 0622  Total Visit Duration:  22 minutes

## 2024-07-02 NOTE — TELEPHONE ENCOUNTER
Returned patient's call off of our voice mail in regards to rescheduling today's missed virtual appointment with MAYA Fraser.  Patient reschedule for today at 6 pm virtual.

## 2024-10-15 ENCOUNTER — OFFICE VISIT (OUTPATIENT)
Dept: ENDOCRINOLOGY | Facility: CLINIC | Age: 24
End: 2024-10-15
Payer: COMMERCIAL

## 2024-10-15 VITALS
SYSTOLIC BLOOD PRESSURE: 130 MMHG | HEART RATE: 83 BPM | BODY MASS INDEX: 36.16 KG/M2 | WEIGHT: 238.6 LBS | HEIGHT: 68 IN | OXYGEN SATURATION: 99 % | DIASTOLIC BLOOD PRESSURE: 60 MMHG

## 2024-10-15 DIAGNOSIS — Z78.9 MALE-TO-FEMALE TRANSGENDER PERSON: Primary | ICD-10-CM

## 2024-10-15 DIAGNOSIS — Z13.1 SCREENING FOR DIABETES MELLITUS: ICD-10-CM

## 2024-10-15 DIAGNOSIS — F41.1 GAD (GENERALIZED ANXIETY DISORDER): ICD-10-CM

## 2024-10-15 DIAGNOSIS — F64.9 GENDER DYSPHORIA: ICD-10-CM

## 2024-10-15 DIAGNOSIS — Z13.220 SCREENING FOR HYPERLIPIDEMIA: ICD-10-CM

## 2024-10-15 PROCEDURE — 99244 OFF/OP CNSLTJ NEW/EST MOD 40: CPT | Performed by: INTERNAL MEDICINE

## 2024-10-15 NOTE — ASSESSMENT & PLAN NOTE
History of Gender dysphoria   Today, first visit for gender affirming care - (Full gender history as noted in HPI)   Chosen name: Margaret  Pronouns: she/they   Gender identity: Female   Previously followed with Behavioral health   Is interested in gender affirming hormonal therapy     Will obtain baseline labs - CMP, HbA1c, lipids, CBC and Prolactin   We discussed about them re-establishing with Behavioral health first prior to considering GAHT   Discussed gender affirming hormonal therapy options - patient's preference today would be oral medication   Follow-up in 2-3 months

## 2024-10-15 NOTE — PATIENT INSTRUCTIONS
Schedule a follow-up with Behavioral health   Get labs done while fasting - chemistry panel, cell counts, lipid panel, prolactin, and hemoglobin A1c

## 2024-10-15 NOTE — PROGRESS NOTES
10/15/2024    Assessment & Plan      {Assess/PlanSmartLinks:51083}    Assessment/Plan:  ***      CC: Transgender Care    History of Present Illness     HPI: Yvon Kerr is a 23 y.o. year old adult with history of Gender dysphoria who presents today for transgender care    Gender history reviewed and updated every visit:-   Name:- (how did you pick this name?) Margaret    Pronouns:- She/They  Gender identify:-   Sex recorded at birth:-     When did you first realize your gender identify did not align with sex assigned at birth?   What parts of your body/aspect of how you feel produce the dysphoria?     How has your social transition been?   Do you see a therapist?  Plans for name/gender marker changes?     What are your ultimate goals for hormonal transition?   Started gender affirming hormonal therapy? Types/routines used?  Previous prescribing providers?  Previous labs?    What are your goals for surgical transition?   Any surgeries done/attempted in past?     Fertility considerations?      Patient is a biological ***     Realized at age  Hormone therapy  Support     Noticed changes in     Hoping for     All other systems were reviewed and are negative.    Historical Information   Past Medical History:   Diagnosis Date    Anxiety     Chronic nasal congestion     Earache, chronic     Migraine     Orthodontics     Premature baby     Wears eyeglasses      Past Surgical History:   Procedure Laterality Date    ACHILLES TENDON LENGTHENING Bilateral     AIRWAY SURGERY  2003    make airway larger with cartilage    BRONCHOSCOPY      Last assessed 02/07/17    CARDIAC SURGERY      MYRINGOTOMY      Last assessed 02/07/17    NASAL SEPTOPLASTY W/ TURBINOPLASTY Bilateral 6/27/2019    Procedure: TURBINATE SUBMUCOUS RESECTION, REPAIR NASAL VESTIBULAR STENOSIS;  Surgeon: Naseem Carlson MD;  Location: [facility];  Service: ENT    NASAL/SINUS ENDOSCOPY N/A 6/27/2019    Procedure: IMAGED GUIDED FESS, ENDOSCOPIC MAXILLARY ANTROSTOMY  "& TOTAL ETHMOIDECTOMY;  Surgeon: Naseem Carlson MD;  Location: [facility];  Service: ENT    PATENT DUCTUS ARTERIOUS LIGATION      Trans catheter closure     Social History   Social History     Substance and Sexual Activity   Alcohol Use No     Social History     Substance and Sexual Activity   Drug Use Yes    Types: Marijuana     Social History     Tobacco Use   Smoking Status Never    Passive exposure: Current   Smokeless Tobacco Never     Family History:   Family History   Problem Relation Age of Onset    Diabetes Father     Hypertension Paternal Grandmother     Cancer Family     No Known Problems Mother        Meds/Allergies   Current Outpatient Medications   Medication Sig Dispense Refill    neomycin-polymyxin-hydrocortisone (CORTISPORIN) otic solution Administer 3 drops to the right ear every 8 (eight) hours for 7 days (Patient not taking: Reported on 10/15/2024) 10 mL 0    sertraline (ZOLOFT) 100 mg tablet Take 1 tablet (100 mg total) by mouth daily (Patient not taking: Reported on 10/15/2024) 90 tablet 1     No current facility-administered medications for this visit.     No Known Allergies    Objective   Vitals: Blood pressure 130/60, pulse 83, height 5' 8\" (1.727 m), weight 108 kg (238 lb 9.6 oz), SpO2 99%.  Invasive Devices       None                   Physical Exam    The history was obtained from the review of the chart and from the {BBPTFAM:20224}.    Review of Systems    Lab Results:      No results found for: \"TESTOSTERONE\"  No results found for: \"ESTRADIOL\"    Lab Results   Component Value Date    HDL 41 04/08/2023    TRIG 132 04/08/2023       Lab Results   Component Value Date    ALT 32 04/08/2023    AST 23 04/08/2023    ALKPHOS 63 04/08/2023       No components found for: \"VITAMIN-D\"    No future appointments.   "

## 2024-10-15 NOTE — PROGRESS NOTES
Assessment and Plan:  1. Male-to-female transgender person  Assessment & Plan:  History of Gender dysphoria   Today, first visit for gender affirming care - (Full gender history as noted in HPI)   Chosen name: Margaret  Pronouns: she/they   Gender identity: Female   Previously followed with Behavioral health   Is interested in gender affirming hormonal therapy     Will obtain baseline labs - CMP, HbA1c, lipids, CBC and Prolactin   We discussed about them re-establishing with Behavioral health first prior to considering GAHT   Discussed gender affirming hormonal therapy options - patient's preference today would be oral medication   Follow-up in 2-3 months   Orders:  -     Ambulatory Referral to Endocrinology  -     Comprehensive metabolic panel; Future; Expected date: 01/15/2025  -     CBC and differential; Future  -     Hemoglobin A1C; Future  -     Lipid panel; Future  -     Prolactin; Future  2. Screening for hyperlipidemia  -     Lipid panel; Future  3. Screening for diabetes mellitus  -     Comprehensive metabolic panel; Future; Expected date: 01/15/2025  -     Hemoglobin A1C; Future         Tobacco and Toxic Substance Assessment and Intervention:     Tobacco use screening performed    Alcohol and drug use screening performed         HPI:   Yvon Kerr is a 23 y.o. year old adult with history of Gender dysphoria who presents today for transgender care as a new patient.     Gender history reviewed and updated every visit:   Name: Margaret - (how did you pick this name?) chosen name since 2021, does not recall the process on how they chose this name.   Pronouns: she/they   Gender identify: woman   Sex recorded at birth: male     When did you first realize your gender identify did not align with sex assigned at birth?   First realized when she was in 2nd or 3rd grade - does not remember how they felt or how they were able to identify this     What parts of your body/aspect of how you feel produce the dysphoria?  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     How has your social transition been?   Described as \"it has been challenging.\" At age 13-14, attempted to come out to mom, and was dismissed as \"no your not\" [trans]. Attempted to come out to friends in high school and was met with disbelief and expressed that this included people from the LGBTQIA+ community.   In June 2024, experienced a panic attack while driving and had thoughts of harming themselves, and got help from an urgent care center. Reports she did not meet criteria for admission to facility at the time. They went home that day. On the following day, Margaret decided to again come out to her mother and stepfather and they describe their parents as \"they don't get it.\" However, they report that they feel much better emotionally since coming out this last July. Patient lives with parents at this time.     Do you have support from Family/Friends/partner?  Reports having support from her current partner who is a cis-gender female, a childhood friend and a few transgender friends.     Do you see a therapist?  Previously, followed with Behavioral health. Does not have a follow-up as she felt was doing well from a behavioral standpoint. Patient was last seen by  in July 2024.   Reports discussing his gender dysphoria with his  provider and they referred patient to Endocrinology.     Plans for name/gender marker changes?   In the future. She would like to first gain confidence with her gender identity at this time.     Have you implemented any appearance changes (such as with clothing, hair " "style/hair grooming, others)?  Uses an electric sharad to groom beard. Has not changed their way of dressing, to which she adds that she would like to feel more supported before doing changes, but also feels comfortable with they way they dress now.     What are your ultimate goals for hormonal transition?   Reports wanting to \"pass as a cis-gender female as much as possible.\" Reports wanting to decrease facial and chest hair, with thinning of hair at other areas. Also, fat redistribution and overall change body structure.     Started gender affirming hormonal therapy? Types/routines used?  None     Previous prescribing providers?  None      Previous labs?  None     What are your goals for surgical transition?   Currently, not sure if they would pursue surgical interventions. Reports she needs to think about it.      Any surgeries done/attempted in past?   None     Fertility considerations?   Reports has a cis-gender female partner - reports they have had discussions about children/reproduction, and says they do not want children.      Substance use - recreational marijuana, reports it helps with her anxiety    Mood changes - +ve for ongoing anxiety, is prescribed Zoloft but is not taking. Denies feelings of depression and also denies SI and HI.       Patient has no other complaints at this time.      Subjective:  Review of Systems   Constitutional:  Negative for chills, diaphoresis, fatigue and unexpected weight change.   Eyes:  Negative for visual disturbance.   Respiratory:  Negative for chest tightness and shortness of breath.    Cardiovascular:  Negative for chest pain and palpitations.   Gastrointestinal:  Negative for abdominal pain, constipation, diarrhea, nausea and vomiting.   Endocrine: Negative for polydipsia, polyphagia and polyuria.   Skin:  Negative for color change and wound.   Neurological:  Negative for tremors, weakness, numbness and headaches.   Psychiatric/Behavioral:  Negative for dysphoric " "mood, self-injury and suicidal ideas. The patient is nervous/anxious.         Patient Active Problem List   Diagnosis    Deviated nasal septum    Nasal septal perforation    Chronic rhinitis    Dysphonia    Eustachian tube disorder, bilateral    Sleep-disordered breathing    Nasal obstruction    Nasal deformity, congenital    Chronic pansinusitis    Nasal septal deviation    JOSELYN (generalized anxiety disorder)    BMI 35.0-35.9,adult    Nutritional counseling    Exercise counseling    Myringotomy tube(s) status    Obesity (BMI 30-39.9)    Moderate recurrent major depression (HCC)    Gender dysphoria in adult    Male-to-female transgender person        Current Outpatient Medications   Medication Sig Dispense Refill    neomycin-polymyxin-hydrocortisone (CORTISPORIN) otic solution Administer 3 drops to the right ear every 8 (eight) hours for 7 days (Patient not taking: Reported on 10/15/2024) 10 mL 0    sertraline (ZOLOFT) 100 mg tablet Take 1 tablet (100 mg total) by mouth daily (Patient not taking: Reported on 10/15/2024) 90 tablet 1     No current facility-administered medications for this visit.        No Known Allergies     The following portions of the patient's history were reviewed and updated as appropriate: allergies, current medications, past family history, past medical history, past social history, past surgical history and problem list.             Objective:  /60 (BP Location: Left arm, Patient Position: Sitting, Cuff Size: Large)   Pulse 83   Ht 5' 8\" (1.727 m)   Wt 108 kg (238 lb 9.6 oz)   SpO2 99%   BMI 36.28 kg/m²      Body mass index is 36.28 kg/m².     Physical Exam  Vitals reviewed.   Constitutional:       General: He is not in acute distress.     Appearance: Normal appearance. He is morbidly obese. He is not ill-appearing.   HENT:      Head: Normocephalic and atraumatic.   Eyes:      General: No scleral icterus.     Conjunctiva/sclera: Conjunctivae normal.   Cardiovascular:      Rate and " Rhythm: Normal rate and regular rhythm.      Pulses: Normal pulses.      Heart sounds: No murmur heard.     No gallop.   Pulmonary:      Effort: Pulmonary effort is normal. No respiratory distress.      Breath sounds: Normal breath sounds. No wheezing or rales.   Abdominal:      General: Bowel sounds are normal. There is no distension.      Palpations: Abdomen is soft.   Musculoskeletal:         General: No tenderness. Normal range of motion.      Cervical back: Normal range of motion and neck supple.      Right lower leg: No edema.      Left lower leg: No edema.   Skin:     General: Skin is warm and dry.      Capillary Refill: Capillary refill takes less than 2 seconds.      Coloration: Skin is not jaundiced or pale.   Neurological:      General: No focal deficit present.      Mental Status: He is alert and oriented to person, place, and time. Mental status is at baseline.      Sensory: No sensory deficit.   Psychiatric:         Mood and Affect: Mood normal.         Behavior: Behavior normal.          Labs:  I have personally reviewed the following labs.   Latest Reference Range & Units 04/08/23 07:37   Sodium 135 - 147 mmol/L 140   Potassium 3.5 - 5.3 mmol/L 3.6   Chloride 96 - 108 mmol/L 102   Carbon Dioxide 21 - 32 mmol/L 31   ANION GAP 4 - 13 mmol/L 7   BUN 5 - 25 mg/dL 13   Creatinine 0.60 - 1.30 mg/dL 1.12   GLUCOSE, FASTING 65 - 99 mg/dL 95   Calcium 8.4 - 10.2 mg/dL 9.5   AST 13 - 39 U/L 23   ALT 7 - 52 U/L 32   ALK PHOS 34 - 104 U/L 63   Total Protein 6.4 - 8.4 g/dL 7.0   Albumin 3.5 - 5.0 g/dL 4.4   Total Bilirubin 0.20 - 1.00 mg/dL 0.79   GFR, Calculated ml/min/1.73sq m 94   Cholesterol See Comment mg/dL 154   Triglycerides See Comment mg/dL 132   HDL >=40 mg/dL 41   LDL Calculated 0 - 100 mg/dL 87        Imaging:  No pertinent imaging available for review at this time.          Cesar Montana MD  Endocrinology Fellow, PGY-4

## 2024-10-16 PROBLEM — Z13.220 SCREENING FOR HYPERLIPIDEMIA: Status: ACTIVE | Noted: 2020-10-23

## 2024-10-16 PROBLEM — Z13.1 SCREENING FOR DIABETES MELLITUS: Status: ACTIVE | Noted: 2020-10-23

## 2024-10-16 PROBLEM — F64.9 GENDER DYSPHORIA: Status: ACTIVE | Noted: 2024-10-16

## 2024-11-07 ENCOUNTER — OFFICE VISIT (OUTPATIENT)
Dept: FAMILY MEDICINE CLINIC | Facility: CLINIC | Age: 24
End: 2024-11-07
Payer: COMMERCIAL

## 2024-11-07 VITALS
WEIGHT: 232.2 LBS | HEART RATE: 94 BPM | SYSTOLIC BLOOD PRESSURE: 125 MMHG | DIASTOLIC BLOOD PRESSURE: 80 MMHG | RESPIRATION RATE: 18 BRPM | BODY MASS INDEX: 35.19 KG/M2 | TEMPERATURE: 97.2 F | HEIGHT: 68 IN

## 2024-11-07 DIAGNOSIS — E66.9 OBESITY (BMI 30-39.9): ICD-10-CM

## 2024-11-07 DIAGNOSIS — F41.1 GAD (GENERALIZED ANXIETY DISORDER): Primary | ICD-10-CM

## 2024-11-07 PROBLEM — F33.1 MODERATE RECURRENT MAJOR DEPRESSION (HCC): Status: RESOLVED | Noted: 2023-09-28 | Resolved: 2024-11-07

## 2024-11-07 PROCEDURE — 99214 OFFICE O/P EST MOD 30 MIN: CPT | Performed by: FAMILY MEDICINE

## 2024-11-07 RX ORDER — BUSPIRONE HYDROCHLORIDE 10 MG/1
10 TABLET ORAL 3 TIMES DAILY
Qty: 90 TABLET | Refills: 5 | Status: SHIPPED | OUTPATIENT
Start: 2024-11-07

## 2024-11-07 RX ORDER — HYDROXYZINE HYDROCHLORIDE 25 MG/1
TABLET, FILM COATED ORAL
Qty: 60 TABLET | Refills: 1 | Status: SHIPPED | OUTPATIENT
Start: 2024-11-07

## 2024-11-07 NOTE — PROGRESS NOTES
Assessment/Plan:    No problem-specific Assessment & Plan notes found for this encounter.    JOSELYN  Failed zoloft and duloxetine  Start buspar with titration q3d  F/u 1m  Atarax prn    Lab slip reprinted for pt to do    Bmi/obesity aware  Has started diet/exercise  Offer wt mgmt referral when ready, can call    Psych consult pending due to gender transition plan     Diagnoses and all orders for this visit:    JOSELYN (generalized anxiety disorder)  -     busPIRone (BUSPAR) 10 mg tablet; Take 1 tablet (10 mg total) by mouth 3 (three) times a day  -     hydrOXYzine HCL (ATARAX) 25 mg tablet; 1-2 po q6hrs prn anxiety    BMI 35.0-35.9,adult    Obesity (BMI 30-39.9)        Return in about 1 month (around 12/7/2024) for Recheck.    Subjective:      Patient ID: Yvon Kerr is a 24 y.o. male.    Chief Complaint   Patient presents with    Follow-up       Jess Lovelace CMA        HPI  Diet was bad  Improving now   Smaller portions  Some bad snacks  Some wt loss  Goes to gym, daily    Has JOSELYN  Zoloft 100mg not helping  Stopped a few months  Not worse w/o  Trigger is the recent election but also with driving and social anxiety    Cymbalta not effective in the past    Starting HRT  Male to female  Since June 2024 came out to parents  They are accepting of the situation, he has support    The following portions of the patient's history were reviewed and updated as appropriate: allergies, current medications, past family history, past medical history, past social history, past surgical history and problem list.    Review of Systems   Psychiatric/Behavioral:  Negative for suicidal ideas. The patient is nervous/anxious.          Current Outpatient Medications   Medication Sig Dispense Refill    busPIRone (BUSPAR) 10 mg tablet Take 1 tablet (10 mg total) by mouth 3 (three) times a day 90 tablet 5    hydrOXYzine HCL (ATARAX) 25 mg tablet 1-2 po q6hrs prn anxiety 60 tablet 1     No current facility-administered medications for this  "visit.       Objective:    /80   Pulse 94   Temp (!) 97.2 °F (36.2 °C)   Resp 18   Ht 5' 8\" (1.727 m)   Wt 105 kg (232 lb 3.2 oz)   BMI 35.31 kg/m²        Physical Exam  Vitals and nursing note reviewed.   Constitutional:       General: He is not in acute distress.     Appearance: He is well-developed. He is obese. He is not ill-appearing.   HENT:      Head: Normocephalic.      Mouth/Throat:      Mouth: Mucous membranes are moist.   Eyes:      General: No scleral icterus.     Conjunctiva/sclera: Conjunctivae normal.   Cardiovascular:      Rate and Rhythm: Normal rate and regular rhythm.      Heart sounds: No murmur heard.  Pulmonary:      Effort: Pulmonary effort is normal. No respiratory distress.   Abdominal:      Palpations: Abdomen is soft.   Musculoskeletal:         General: No deformity.      Cervical back: Neck supple.      Right lower leg: No edema.      Left lower leg: No edema.   Skin:     General: Skin is warm and dry.      Coloration: Skin is not pale.   Neurological:      Mental Status: He is alert.      Motor: No weakness.      Gait: Gait normal.   Psychiatric:         Mood and Affect: Mood is anxious.         Behavior: Behavior normal.         Thought Content: Thought content normal.       Depression Screening Follow-up Plan: Patient's depression screening was positive with a PHQ-2 score of 6. Their PHQ-9 score was 16. Clinically patient does not have depression. No treatment is required. Patient assessed for underlying major depression. They have no active suicidal ideations. Brief counseling provided and recommend additional follow-up/re-evaluation next office visit.           Warren Moreno DO    "

## 2024-11-07 NOTE — PATIENT INSTRUCTIONS
For anxiety, please try Buspar 10mg three times a day but start with   5mg twice a day for 3 days then   5mg three times a day for 3 days then   10/5/5 for 3 days then   10/5/10 for 3 days then   10/10/10 thereafter.

## 2024-11-15 PROBLEM — Z13.220 SCREENING FOR HYPERLIPIDEMIA: Status: RESOLVED | Noted: 2020-10-23 | Resolved: 2024-11-15

## 2025-01-10 LAB
ALBUMIN SERPL-MCNC: 4.7 G/DL (ref 3.6–5.1)
ALBUMIN/GLOB SERPL: 2.4 (CALC) (ref 1–2.5)
ALP SERPL-CCNC: 62 U/L (ref 36–130)
ALT SERPL-CCNC: 24 U/L (ref 9–46)
AST SERPL-CCNC: 16 U/L (ref 10–40)
BASOPHILS # BLD AUTO: 49 CELLS/UL (ref 0–200)
BASOPHILS NFR BLD AUTO: 1.3 %
BILIRUB SERPL-MCNC: 1.1 MG/DL (ref 0.2–1.2)
BUN SERPL-MCNC: 13 MG/DL (ref 7–25)
BUN/CREAT SERPL: NORMAL (CALC) (ref 6–22)
CALCIUM SERPL-MCNC: 9.5 MG/DL (ref 8.6–10.3)
CHLORIDE SERPL-SCNC: 105 MMOL/L (ref 98–110)
CHOLEST SERPL-MCNC: 148 MG/DL
CHOLEST/HDLC SERPL: 3.9 (CALC)
CO2 SERPL-SCNC: 31 MMOL/L (ref 20–32)
CREAT SERPL-MCNC: 1.04 MG/DL (ref 0.6–1.24)
EOSINOPHIL # BLD AUTO: 118 CELLS/UL (ref 15–500)
EOSINOPHIL NFR BLD AUTO: 3.1 %
ERYTHROCYTE [DISTWIDTH] IN BLOOD BY AUTOMATED COUNT: 12.3 % (ref 11–15)
GFR/BSA.PRED SERPLBLD CYS-BASED-ARV: 104 ML/MIN/1.73M2
GLOBULIN SER CALC-MCNC: 2 G/DL (CALC) (ref 1.9–3.7)
GLUCOSE SERPL-MCNC: 96 MG/DL (ref 65–99)
HBA1C MFR BLD: 4.9 % OF TOTAL HGB
HCT VFR BLD AUTO: 47.1 % (ref 38.5–50)
HDLC SERPL-MCNC: 38 MG/DL
HGB BLD-MCNC: 16.2 G/DL (ref 13.2–17.1)
LDLC SERPL CALC-MCNC: 92 MG/DL (CALC)
LYMPHOCYTES # BLD AUTO: 1395 CELLS/UL (ref 850–3900)
LYMPHOCYTES NFR BLD AUTO: 36.7 %
MCH RBC QN AUTO: 31 PG (ref 27–33)
MCHC RBC AUTO-ENTMCNC: 34.4 G/DL (ref 32–36)
MCV RBC AUTO: 90.1 FL (ref 80–100)
MONOCYTES # BLD AUTO: 445 CELLS/UL (ref 200–950)
MONOCYTES NFR BLD AUTO: 11.7 %
NEUTROPHILS # BLD AUTO: 1794 CELLS/UL (ref 1500–7800)
NEUTROPHILS NFR BLD AUTO: 47.2 %
NONHDLC SERPL-MCNC: 110 MG/DL (CALC)
PLATELET # BLD AUTO: 222 THOUSAND/UL (ref 140–400)
PMV BLD REES-ECKER: 10.1 FL (ref 7.5–12.5)
POTASSIUM SERPL-SCNC: 4 MMOL/L (ref 3.5–5.3)
PROT SERPL-MCNC: 6.7 G/DL (ref 6.1–8.1)
RBC # BLD AUTO: 5.23 MILLION/UL (ref 4.2–5.8)
SODIUM SERPL-SCNC: 142 MMOL/L (ref 135–146)
TRIGL SERPL-MCNC: 85 MG/DL
WBC # BLD AUTO: 3.8 THOUSAND/UL (ref 3.8–10.8)

## 2025-01-14 ENCOUNTER — RESULTS FOLLOW-UP (OUTPATIENT)
Dept: ENDOCRINOLOGY | Facility: CLINIC | Age: 25
End: 2025-01-14

## 2025-01-21 ENCOUNTER — OFFICE VISIT (OUTPATIENT)
Dept: ENDOCRINOLOGY | Facility: CLINIC | Age: 25
End: 2025-01-21
Payer: COMMERCIAL

## 2025-01-21 VITALS
SYSTOLIC BLOOD PRESSURE: 120 MMHG | HEART RATE: 82 BPM | BODY MASS INDEX: 34.29 KG/M2 | OXYGEN SATURATION: 97 % | DIASTOLIC BLOOD PRESSURE: 80 MMHG | HEIGHT: 69 IN

## 2025-01-21 DIAGNOSIS — Z78.9 MALE-TO-FEMALE TRANSGENDER PERSON: Primary | ICD-10-CM

## 2025-01-21 DIAGNOSIS — F64.9 GENDER DYSPHORIA: ICD-10-CM

## 2025-01-21 DIAGNOSIS — E66.811 CLASS 1 OBESITY WITHOUT SERIOUS COMORBIDITY WITH BODY MASS INDEX (BMI) OF 34.0 TO 34.9 IN ADULT, UNSPECIFIED OBESITY TYPE: ICD-10-CM

## 2025-01-21 PROCEDURE — 99214 OFFICE O/P EST MOD 30 MIN: CPT | Performed by: INTERNAL MEDICINE

## 2025-01-22 NOTE — ASSESSMENT & PLAN NOTE
History of Gender dysphoria   Today, second visit for gender affirming care - (Full gender history as noted in HPI)   Chosen name: Margaret  Pronouns: she/they   Gender identity: Female   Previously followed with Behavioral health   Is interested in gender affirming hormonal therapy   Reviewed  CMP, HbA1c, lipids, CBC   We discussed about them re-establishing with Behavioral health first prior to considering [facility]  Intake appointment is on March 4th, 2025 at 11:45am on 2025 at [facility] (formerly [facility] in [place], [place]   Follow-up in 3 months

## 2025-01-27 ENCOUNTER — TELEPHONE (OUTPATIENT)
Dept: ENDOCRINOLOGY | Facility: CLINIC | Age: 25
End: 2025-01-27

## 2025-01-28 ENCOUNTER — TELEPHONE (OUTPATIENT)
Dept: ENDOCRINOLOGY | Facility: CLINIC | Age: 25
End: 2025-01-28

## 2025-01-28 NOTE — TELEPHONE ENCOUNTER
Called patient and left a voicemail.We need to reschedule the 5- appointment with Jacinda Nichols. I am holding 4- with Dr. Moreland at 9:40.This patient should be seen by Dr. Moreland. If patient would like this visit you can send encounter. If patient needs to discuss please call the office. You may also team me if you are unable to reach the office.

## 2025-02-07 ENCOUNTER — DOCUMENTATION (OUTPATIENT)
Dept: PSYCHIATRY | Facility: CLINIC | Age: 25
End: 2025-02-07

## 2025-02-07 DIAGNOSIS — F41.1 GAD (GENERALIZED ANXIETY DISORDER): Primary | ICD-10-CM

## 2025-02-07 DIAGNOSIS — F64.9 GENDER DYSPHORIA: ICD-10-CM

## 2025-02-07 NOTE — PROGRESS NOTES
Psychotherapy Discharge Summary    Preferred Name: Yvon Kerr  YOB: 2000    Admission date to psychotherapy: 8/29/23    Referred by: CAIO Vera    Presenting Problem: depression, gender dysphoria, anxiety    Course of treatment included : individual therapy     Progress/Outcome of Treatment Goals (brief summary of course of treatment) Ct worked in sessions to process difficulties pertaining to gender and others accepting his gender identity. Ct struggled with bouts of depression but reached out to friends and loved ones for support. Ct came to conclusion that he will transition to female gender, begin HRT, reports he is happy about this decision.    Treatment Complications (if any): none    Treatment Progress: good    Current [facility] Psychiatric Provider: Eileen Correa    Discharge Medications include:  writer does not prescribe medications    Discharge Date: 2/7/25    Discharge Diagnosis:   1. JOSELYN (generalized anxiety disorder)        2. Gender dysphoria            Criteria for Discharge: completed treatment goals and objectives and is no longer in need of services    Aftercare recommendations include (include specific referral names and phone numbers, if appropriate): ct will continue medication management with provider    Prognosis: good

## 2025-05-28 ENCOUNTER — TELEPHONE (OUTPATIENT)
Age: 25
End: 2025-05-28

## 2025-05-28 DIAGNOSIS — F31.60 BIPOLAR AFFECTIVE DISORDER, CURRENT EPISODE MIXED, CURRENT EPISODE SEVERITY UNSPECIFIED (HCC): Primary | ICD-10-CM

## 2025-05-28 RX ORDER — OXCARBAZEPINE 150 MG/1
150 TABLET, FILM COATED ORAL 2 TIMES DAILY
Qty: 20 TABLET | Refills: 0 | Status: SHIPPED | OUTPATIENT
Start: 2025-05-28

## 2025-05-28 NOTE — TELEPHONE ENCOUNTER
If you are agreeable to refill for him, we can confirm pharmacy to send it to. [facility] is listed in his chart [id number]

## 2025-05-28 NOTE — TELEPHONE ENCOUNTER
Patient's mom calling to set up follow up post behavioral unit at [facility] (discharged on 5/16/25).  He was recently diagnosed with Bipolar and borderline personality disorder.  He has a new patient appointment set up with Psych at [facility] for Monday June 2.   Patient was scheduled for 6/4 for a follow up as next available for Dr. Moreno, warm transfer attempted.   He was prescribed Oxcarbazepine 150 mg sig 1 tablet 2x per day, but they only gave him a 14 day supply and he will run out this Saturday.  Mom is concerned since he has been doing very well on this medication.  She was wondering if Dr. Moreno could give him enough to hold him over until his New Patient psych appointment on Monday?  Please review.

## 2025-06-16 ENCOUNTER — OFFICE VISIT (OUTPATIENT)
Dept: ENDOCRINOLOGY | Facility: CLINIC | Age: 25
End: 2025-06-16
Payer: COMMERCIAL

## 2025-06-16 VITALS
BODY MASS INDEX: 32.29 KG/M2 | SYSTOLIC BLOOD PRESSURE: 121 MMHG | WEIGHT: 218 LBS | HEART RATE: 86 BPM | DIASTOLIC BLOOD PRESSURE: 72 MMHG | OXYGEN SATURATION: 97 % | HEIGHT: 69 IN

## 2025-06-16 DIAGNOSIS — Z78.9 MALE-TO-FEMALE TRANSGENDER PERSON: Primary | ICD-10-CM

## 2025-06-16 DIAGNOSIS — F41.1 GAD (GENERALIZED ANXIETY DISORDER): ICD-10-CM

## 2025-06-16 DIAGNOSIS — F64.9 GENDER DYSPHORIA: ICD-10-CM

## 2025-06-16 PROCEDURE — 99214 OFFICE O/P EST MOD 30 MIN: CPT | Performed by: INTERNAL MEDICINE

## 2025-06-16 RX ORDER — QUETIAPINE FUMARATE 25 MG/1
25 TABLET, FILM COATED ORAL
COMMUNITY

## 2025-06-16 NOTE — PROGRESS NOTES
" Yvon Kerr 24 y.o. male MRN: [id number]    Encounter: [id number]  Assessment & Plan  Gender dysphoria  2.  Male to female transgender  3. History of depression/anxiety, bipolar disorder, borderline personality disorder  -Continue follow up with psych/behavioral health; advised to discuss hopes of starting gender affirming hormone therapy with them.  Follow up in 4-6 months when ready to start hormone therapy from a behavioral health standpoint    4.  History of obesity-continue healthy lifestyle measures    CC:  [facility]    History of Present Illness    Fawn \"Margaret\" is a 24-year-old adult who is here for a follow-up.  Male to female transgender.  Last seen in clinic 1/21/2025.    Had a panic attack in May, admitted to the behavioral health unit for a few days- diagnosed with Bipolar disorder, borderline personality disorder as well as depression/ anxiety. Started on Oxacarbazepine  which she feels are helping a lot. Feels much better, less anxiety, not having panic attacks, able to work better.     Has a new psychiatrist and therapist at [facility].     Broke up with girlfriend. Hoping she will be able to start HRT prior to the end of th year. Has her mothers support.  Close friends, whom she is out with call her Margaret    Has not discussed gender dysphoria with her new  team, plans to in the near future/ follow up   Trying to eat well. Active at work     All other systems were reviewed and are negative.       Review of Systems    Historical Information   Past Medical History[1]  Past Surgical History[2]  Social History   Social History     Substance and Sexual Activity   Alcohol Use No     Social History     Substance and Sexual Activity   Drug Use Yes    Types: Marijuana     Tobacco Use History[3]  Family History: Family History[4]    Meds/Allergies   Current Medications[5]  Allergies[6]    Objective   Vitals: Blood pressure 121/72, pulse 86, height 5' 9\" (1.753 m), weight 98.9 kg (218 lb), SpO2 97%.    Physical " "Exam  Constitutional:       General: She is not in acute distress.     Appearance: She is well-developed. She is not diaphoretic.   HENT:      Head: Normocephalic and atraumatic.     Eyes:      Conjunctiva/sclera: Conjunctivae normal.       Cardiovascular:      Rate and Rhythm: Normal rate and regular rhythm.      Heart sounds: Normal heart sounds. No murmur heard.  Pulmonary:      Effort: Pulmonary effort is normal. No respiratory distress.      Breath sounds: Normal breath sounds. No wheezing.   Abdominal:      General: There is no distension.      Palpations: Abdomen is soft.      Tenderness: There is no abdominal tenderness. There is no guarding.     Musculoskeletal:      Cervical back: Normal range of motion and neck supple.     Skin:     General: Skin is warm and dry.      Findings: No erythema or rash.     Neurological:      Mental Status: She is alert and oriented to person, place, and time.     Psychiatric:         Behavior: Behavior normal.         Thought Content: Thought content normal.         The history was obtained from the review of the chart, patient.    Lab Results:      Lab Results   Component Value Date    WBC 3.8 01/09/2025    HGB 16.2 01/09/2025    HCT 47.1 01/09/2025    MCV 90.1 01/09/2025     01/09/2025     Lab Results   Component Value Date    CREATININE 1.04 01/09/2025    BUN 13 01/09/2025    K 4.0 01/09/2025     01/09/2025    CO2 31 01/09/2025     Lab Results   Component Value Date    HGBA1C 4.9 01/09/2025         Imaging Studies:       Results Review Statement: No pertinent imaging studies reviewed.    Portions of the record may have been created with voice recognition software. Occasional wrong word or \"sound a like\" substitutions may have occurred due to the inherent limitations of voice recognition software. Read the chart carefully and recognize, using context, where substitutions have occurred.         [1]   Past Medical History:  Diagnosis Date    Anxiety     Chronic " nasal congestion     Earache, chronic     Migraine     Orthodontics     Premature baby     Wears eyeglasses    [2]   Past Surgical History:  Procedure Laterality Date    ACHILLES TENDON LENGTHENING Bilateral     AIRWAY SURGERY  2003    make airway larger with cartilage    BRONCHOSCOPY      Last assessed 02/07/17    CARDIAC SURGERY      MYRINGOTOMY      Last assessed 02/07/17    NASAL SEPTOPLASTY W/ TURBINOPLASTY Bilateral 6/27/2019    Procedure: TURBINATE SUBMUCOUS RESECTION, REPAIR NASAL VESTIBULAR STENOSIS;  Surgeon: Naseem Carlson MD;  Location: [facility];  Service: ENT    NASAL/SINUS ENDOSCOPY N/A 6/27/2019    Procedure: IMAGED GUIDED FESS, ENDOSCOPIC MAXILLARY ANTROSTOMY & TOTAL ETHMOIDECTOMY;  Surgeon: Naseem Carlson MD;  Location: [facility];  Service: ENT    PATENT DUCTUS ARTERIOUS LIGATION      Trans catheter closure   [3]   Social History  Tobacco Use   Smoking Status Never    Passive exposure: Never   Smokeless Tobacco Never   [4]   Family History  Problem Relation Name Age of Onset    Diabetes Father      Hypertension Paternal Grandmother      Cancer Family PGGF     No Known Problems Mother     [5]   Current Outpatient Medications   Medication Sig Dispense Refill    busPIRone (BUSPAR) 10 mg tablet Take 1 tablet (10 mg total) by mouth 3 (three) times a day (Patient taking differently: Take 15 mg by mouth in the morning and 15 mg before bedtime.) 90 tablet 5    hydrOXYzine HCL (ATARAX) 25 mg tablet 1-2 po q6hrs prn anxiety 60 tablet 1    OXcarbazepine (Trileptal) 150 mg tablet Take 1 tablet (150 mg total) by mouth 2 (two) times a day 20 tablet 0    QUEtiapine (SEROquel) 25 mg tablet Take 25 mg by mouth daily at bedtime       No current facility-administered medications for this visit.   [6] No Known Allergies

## (undated) DEVICE — GLOVE SRG BIOGEL 7.5

## (undated) DEVICE — SPLINT INTRANASAL 0.6 X 2 IN POSISEP X

## (undated) DEVICE — PATIENT TRACKER 9734887XOM NON-INVASIVE

## (undated) DEVICE — TUBING SUCTION 5MM X 12 FT

## (undated) DEVICE — SUT PLAIN 4-0 SC-1 18 IN 1828H

## (undated) DEVICE — INSTRUMENT TRACKER 9733533XOM ENT 1PK

## (undated) DEVICE — NEEDLE 25G X 1 1/2

## (undated) DEVICE — SUT CHROMIC 5-0 P-3 18 IN 687G

## (undated) DEVICE — SUT ETHILON 4-0 PS-2 18 IN 1667H

## (undated) DEVICE — TUBING 1895522 5PK STRAIGHTSHOT TO XPS: Brand: STRAIGHTSHOT®

## (undated) DEVICE — BLADE 1884080EM TRICUT 4MMX13CM M4 ROHS: Brand: FUSION®

## (undated) DEVICE — 2000CC GUARDIAN II: Brand: GUARDIAN

## (undated) DEVICE — ANTI-FOG SOLUTION WITH FOAM PAD: Brand: DEVON

## (undated) DEVICE — GLOVE SRG BIOGEL ECLIPSE 7

## (undated) DEVICE — GLOVE INDICATOR PI UNDERGLOVE SZ 7.5 BLUE

## (undated) DEVICE — STERILE NASAL PACK: Brand: CARDINAL HEALTH

## (undated) DEVICE — ELECTRODE NEEDLE MOD E-Z CLEAN 2.75IN 7CM -0013M

## (undated) DEVICE — SPLINT 1524050 5PK PAIR DOYLE II AIRWAY: Brand: DOYLE II ™

## (undated) DEVICE — SHEATH 1912000 5PK 4MM/0DEG STORZ XOMED: Brand: ENDO-SCRUB®

## (undated) DEVICE — SCD SEQUENTIAL COMPRESSION COMFORT SLEEVE MEDIUM KNEE LENGTH: Brand: KENDALL SCD

## (undated) DEVICE — SKIN MARKER DUAL TIP WITH RULER CAP, FLEXIBLE RULER AND LABELS: Brand: DEVON

## (undated) DEVICE — Device

## (undated) DEVICE — BLADE 1882040 5PK INFERIOR TURB 2MM

## (undated) DEVICE — NEEDLE 18 G X 1 1/2

## (undated) DEVICE — ASTOUND STANDARD SURGICAL GOWN, XXL: Brand: CONVERTORS

## (undated) DEVICE — SYRINGE 3ML LL

## (undated) DEVICE — SHEATH 1912010 5PK 4MM/30DEG STORZ XOMED: Brand: ENDO-SCRUB®

## (undated) DEVICE — NEURO PATTIES 1/2 X 3

## (undated) DEVICE — SYRINGE 10ML LL CONTROL TOP